# Patient Record
Sex: FEMALE | Race: WHITE | NOT HISPANIC OR LATINO | Employment: PART TIME | URBAN - METROPOLITAN AREA
[De-identification: names, ages, dates, MRNs, and addresses within clinical notes are randomized per-mention and may not be internally consistent; named-entity substitution may affect disease eponyms.]

---

## 2017-10-03 ENCOUNTER — HOSPITAL ENCOUNTER (EMERGENCY)
Facility: CLINIC | Age: 27
Discharge: HOME | End: 2017-10-04
Attending: PSYCHIATRY & NEUROLOGY | Admitting: PSYCHIATRY & NEUROLOGY
Payer: COMMERCIAL

## 2017-10-03 DIAGNOSIS — F41.9 ANXIETY: ICD-10-CM

## 2017-10-03 PROCEDURE — 25000132 ZZH RX MED GY IP 250 OP 250 PS 637: Performed by: PSYCHIATRY & NEUROLOGY

## 2017-10-03 PROCEDURE — 90791 PSYCH DIAGNOSTIC EVALUATION: CPT

## 2017-10-03 PROCEDURE — 99285 EMERGENCY DEPT VISIT HI MDM: CPT | Mod: 25 | Performed by: PSYCHIATRY & NEUROLOGY

## 2017-10-03 PROCEDURE — 99284 EMERGENCY DEPT VISIT MOD MDM: CPT | Mod: Z6 | Performed by: PSYCHIATRY & NEUROLOGY

## 2017-10-03 RX ORDER — ACETAMINOPHEN 325 MG/1
650 TABLET ORAL EVERY 4 HOURS PRN
Status: DISCONTINUED | OUTPATIENT
Start: 2017-10-03 | End: 2017-10-04 | Stop reason: HOSPADM

## 2017-10-03 RX ORDER — HYDROXYZINE HYDROCHLORIDE 25 MG/1
25-50 TABLET, FILM COATED ORAL EVERY 6 HOURS PRN
Qty: 60 TABLET | Refills: 1 | Status: SHIPPED | OUTPATIENT
Start: 2017-10-03 | End: 2019-06-05

## 2017-10-03 RX ORDER — HYDROXYZINE HYDROCHLORIDE 25 MG/1
50 TABLET, FILM COATED ORAL ONCE
Status: COMPLETED | OUTPATIENT
Start: 2017-10-03 | End: 2017-10-03

## 2017-10-03 RX ADMIN — ACETAMINOPHEN 650 MG: 325 TABLET, FILM COATED ORAL at 23:26

## 2017-10-03 RX ADMIN — HYDROXYZINE HYDROCHLORIDE 50 MG: 25 TABLET ORAL at 23:15

## 2017-10-03 ASSESSMENT — ENCOUNTER SYMPTOMS
SHORTNESS OF BREATH: 0
NERVOUS/ANXIOUS: 1
CHEST TIGHTNESS: 0
DYSPHORIC MOOD: 1
HALLUCINATIONS: 0
ABDOMINAL PAIN: 0
BACK PAIN: 0
DIZZINESS: 0
FEVER: 0

## 2017-10-03 NOTE — ED AVS SNAPSHOT
[facility], Emergency Department    [address]    [place] [address]    Phone:  [phone]    Fax:  [phone]                                       Glenys Vuong   MRN: [id number]    Department:  [facility], Emergency Department   Date of Visit:  10/3/2017           After Visit Summary Signature Page     I have received my discharge instructions, and my questions have been answered. I have discussed any challenges I see with this plan with the nurse or doctor.    ..........................................................................................................................................  Patient/Patient Representative Signature      ..........................................................................................................................................  Patient Representative Print Name and Relationship to Patient    ..................................................               ................................................  Date                                            Time    ..........................................................................................................................................  Reviewed by Signature/Title    ...................................................              ..............................................  Date                                                            Time

## 2017-10-03 NOTE — ED AVS SNAPSHOT
[facility], Emergency Department    [address]    [place] [address]    Phone:  [phone]    Fax:  [phone]                                       Glenys Vuong   MRN: [id number]    Department:  [facility], Emergency Department   Date of Visit:  10/3/2017           Patient Information     Date Of Birth          1990        Your diagnoses for this visit were:     Anxiety        You were seen by Oh Smith MD.        Discharge Instructions       Start vistaril (hydroxyzine) 25-50 mg every 6 hours as needed for anxiety/sleep    Go to therapy on Friday 10/6/17 at noon    24 Hour Appointment Hotline       To make an appointment at any [facility] clinic, call [phone] ([phone]). If you don't have a family doctor or clinic, we will help you find one. [facility] clinics are conveniently located to serve the needs of you and your family.             Review of your medicines      START taking        Dose / Directions Last dose taken    hydrOXYzine 25 MG tablet   Commonly known as:  ATARAX   Dose:  25-50 mg   Quantity:  60 tablet        Take 1-2 tablets (25-50 mg) by mouth every 6 hours as needed for itching or anxiety   Refills:  1                Prescriptions were sent or printed at these locations (1 Prescription)                   Other Prescriptions                Printed at Department/Unit printer (1 of 1)         hydrOXYzine (ATARAX) 25 MG tablet                Orders Needing Specimen Collection     Ordered          10/03/17 1718  Drug abuse screen 6 urine (tox) - STAT, [facility]: STAT, Needs to be Collected     Scheduled Task Status   10/03/17 1718 Collect Drug abuse screen 6 urine (tox) Open   Order Class:  PCU Collect                10/03/17 1718  HCG qualitative urine - STAT, [facility]: STAT, Needs to be Collected     Scheduled Task Status   10/03/17 1718 Collect HCG qualitative urine Open   Order Class:  PCU Collect                  Pending Results     No orders found from  "10/1/2017 to 10/4/2017.            Pending Culture Results     No orders found from 10/1/2017 to 10/4/2017.            Pending Results Instructions     If you had any lab results that were not finalized at the time of your Discharge, you can call the ED Lab Result RN at [phone]. You will be contacted by this team for any positive Lab results or changes in treatment. The nurses are available 7 days a week from 10A to 6:30P.  You can leave a message 24 hours per day and they will return your call.        Thank you for choosing [facility]       Thank you for choosing [facility] for your care. Our goal is always to provide you with excellent care. Hearing back from our patients is one way we can continue to improve our services. Please take a few minutes to complete the written survey that you may receive in the mail after you visit with us. Thank you!        [facility] [employer] Information     [employer] lets you send messages to your doctor, view your test results, renew your prescriptions, schedule appointments and more. To sign up, go to [email or web address] . Click on \"Log in\" on the left side of the screen, which will take you to the Welcome page. Then click on \"Sign up Now\" on the right side of the page.     You will be asked to enter the access code listed below, as well as some personal information. Please follow the directions to create your username and password.     Your access code is: [email or web address]  Expires: 2018  9:56 PM     Your access code will  in 90 days. If you need help or a new code, please call your [facility] clinic or [phone].        Care EveryWhere ID     This is your Care EveryWhere ID. This could be used by other organizations to access your [facility] medical records  [id number]        Equal Access to Services     LEXX MELGAR : Shai Jorgensen, mirta cooper, evy silver. So [facility] [phone].    ATENCIÓN: Si habla " español, tiene a hastings disposición servicios gratuitos de asistencia lingüística. Eri al [phone].    We comply with applicable federal civil rights laws and [place] laws. We do not discriminate on the basis of race, color, national origin, age, disability, sex, sexual orientation, or gender identity.            After Visit Summary       This is your record. Keep this with you and show to your community pharmacist(s) and doctor(s) at your next visit.

## 2017-10-04 VITALS
BODY MASS INDEX: 32.21 KG/M2 | HEIGHT: 63 IN | TEMPERATURE: 98.2 F | DIASTOLIC BLOOD PRESSURE: 76 MMHG | SYSTOLIC BLOOD PRESSURE: 123 MMHG | OXYGEN SATURATION: 100 % | RESPIRATION RATE: 16 BRPM | WEIGHT: 181.8 LBS | HEART RATE: 72 BPM

## 2017-10-04 NOTE — DISCHARGE INSTRUCTIONS
Start vistaril (hydroxyzine) 25-50 mg every 6 hours as needed for anxiety/sleep    Go to therapy on Friday 10/6/17 at noon

## 2017-10-04 NOTE — ED PROVIDER NOTES
"  History     Chief Complaint   Patient presents with     Suicidal     has increasing thoughts of harming self but denies plans     The history is provided by medical records and the patient.     Glenys Vuong is a 26 year old female who comes in due to her talking to a crisis line and them telling her she had to come into the ED for an evaluation or they would send the police to her house.  She lives with her gf.  She has had increased anxiety and depression the last 3-4 weeks.  This is due to some stressors including getting fired for no cause at her job when she was just starting to feel comfortable and good at it.  She got a new job but the training is stressful. She has been making it to work but has become tearful, panicky and breaking out into hives before going.  She feels hopeless and struggles with her mood.  She has a poor self esteem and \"I do not fit in.\"  She has chronic passive suicidal thoughts but no plan.  She states she would not do that to her family or gf.  She worries about her siblings and mom due to their own mental illness issues.  She is leery of medications and has tried them in the past.  She also has gone to a lot of therapy but feels it does not work for her.  She is tired of talking about her problems that they cannot fix.      Please see the 's assessment in [employer] from today for further details.    I have reviewed the Medications, Allergies, Past Medical and Surgical History, and Social History in the Epic system.    Review of Systems   Constitutional: Negative for fever.   Eyes: Negative for visual disturbance.   Respiratory: Negative for chest tightness and shortness of breath.    Cardiovascular: Negative for chest pain.   Gastrointestinal: Negative for abdominal pain.   Musculoskeletal: Negative for back pain.   Neurological: Negative for dizziness.   Psychiatric/Behavioral: Positive for dysphoric mood and suicidal ideas (passive, chronic). Negative for hallucinations and " "self-injury. The patient is nervous/anxious.    All other systems reviewed and are negative.      Physical Exam   BP: 136/66  Heart Rate: 75  Temp: 97.6  F (36.4  C)  Resp: 16  Height: 160 cm (5' 3\")  Weight: 82.5 kg (181 lb 12.8 oz)  SpO2: 97 %  Physical Exam   Constitutional: She is oriented to person, place, and time. She appears well-developed and well-nourished.   HENT:   Head: Normocephalic and atraumatic.   Mouth/Throat: Oropharynx is clear and moist.   Eyes: Pupils are equal, round, and reactive to light.   Neck: Normal range of motion. Neck supple.   Cardiovascular: Normal rate, regular rhythm and normal heart sounds.    Pulmonary/Chest: Effort normal and breath sounds normal.   Abdominal: Soft. Bowel sounds are normal.   Musculoskeletal: Normal range of motion.   Neurological: She is alert and oriented to person, place, and time.   Skin: Skin is warm and dry.   Psychiatric: Her speech is normal and behavior is normal. Judgment normal. Her mood appears anxious. She is not actively hallucinating. Thought content is not paranoid and not delusional. Cognition and memory are normal. She exhibits a depressed mood. She expresses suicidal (passive, chronic) ideation. She expresses no homicidal ideation. She expresses no suicidal plans and no homicidal plans.   Glenys is a 27 y/o female who looks her age.  She is well groomed with good eye contact.   Nursing note and vitals reviewed.      ED Course     ED Course     Procedures               Labs Ordered and Resulted from Time of ED Arrival Up to the Time of Departure from the ED - No data to display         Assessments & Plan (with Medical Decision Making)   Glenys will be discharged home.  She is not an imminent risk to herself or others.  She will be started on vistaril 25-50 mg q6 hours prn for anxiety/sleep.  She will be set up for therapy on 10/6/17.  She has chronic suicidal thoughts and is at a higher baseline risk secondary to her history and family " history.      I have reviewed the nursing notes.    I have reviewed the findings, diagnosis, plan and need for follow up with the patient.    New Prescriptions    HYDROXYZINE (ATARAX) 25 MG TABLET    Take 1-2 tablets (25-50 mg) by mouth every 6 hours as needed for itching or anxiety       Final diagnoses:   Anxiety       10/3/2017   [facility], [place], EMERGENCY DEPARTMENT     Oh Smith MD  10/03/17 5228

## 2017-10-04 NOTE — ED NOTES
Pt arrives to [facility]. Psych Associate explains process to pt; they will meet with a doctor and a mental health accessor and a plan will be determined from there. Pt then given a urine cup and asked to leave a urine sample.  Pt offered nutrition, fluids and comfort measures and told it may be a 2-5 hour time frame for complete assessment.

## 2017-10-09 ENCOUNTER — HOSPITAL ENCOUNTER (EMERGENCY)
Facility: CLINIC | Age: 27
Discharge: HOME | End: 2017-10-09
Attending: FAMILY MEDICINE | Admitting: FAMILY MEDICINE
Payer: COMMERCIAL

## 2017-10-09 VITALS
TEMPERATURE: 97.9 F | BODY MASS INDEX: 31.96 KG/M2 | DIASTOLIC BLOOD PRESSURE: 65 MMHG | WEIGHT: 180.4 LBS | OXYGEN SATURATION: 98 % | HEART RATE: 65 BPM | SYSTOLIC BLOOD PRESSURE: 107 MMHG | RESPIRATION RATE: 16 BRPM

## 2017-10-09 DIAGNOSIS — F41.9 ANXIETY: ICD-10-CM

## 2017-10-09 PROCEDURE — 99284 EMERGENCY DEPT VISIT MOD MDM: CPT | Mod: Z6 | Performed by: FAMILY MEDICINE

## 2017-10-09 PROCEDURE — 99285 EMERGENCY DEPT VISIT HI MDM: CPT | Mod: 25 | Performed by: FAMILY MEDICINE

## 2017-10-09 PROCEDURE — 90791 PSYCH DIAGNOSTIC EVALUATION: CPT

## 2017-10-09 RX ORDER — ESCITALOPRAM OXALATE 10 MG/1
10 TABLET ORAL DAILY
Qty: 30 TABLET | Refills: 0 | Status: SHIPPED | OUTPATIENT
Start: 2017-10-09 | End: 2018-07-20

## 2017-10-09 ASSESSMENT — ENCOUNTER SYMPTOMS
DYSURIA: 0
ACTIVITY CHANGE: 0
DIARRHEA: 0
FLANK PAIN: 0
NAUSEA: 1
WEAKNESS: 0
EYE REDNESS: 0
DIFFICULTY URINATING: 0
APPETITE CHANGE: 1
CHEST TIGHTNESS: 0
COLOR CHANGE: 0
SEIZURES: 0
VOMITING: 0
DECREASED CONCENTRATION: 1
DYSPHORIC MOOD: 1
CONFUSION: 0
SHORTNESS OF BREATH: 0
ABDOMINAL PAIN: 1
HEADACHES: 0
TROUBLE SWALLOWING: 0
NECK STIFFNESS: 0
FATIGUE: 1
NERVOUS/ANXIOUS: 1
ARTHRALGIAS: 0
FEVER: 0

## 2017-10-09 NOTE — ED AVS SNAPSHOT
[facility], Emergency Department    [address]    [place] [address]    Phone:  [phone]    Fax:  [phone]                                       Glenys Vuong   MRN: [id number]    Department:  [facility], Emergency Department   Date of Visit:  10/9/2017           Patient Information     Date Of Birth          1990        Your diagnoses for this visit were:     Anxiety        You were seen by Murali Dominguez MD.      Follow-up Information     Follow up with No Ref-Primary, Physician.    Contact information:    NO REF-PRIMARY PHYSICIAN          Discharge Instructions       Home.    To go to appointment this Wednesday as scheduled at 10am [facility] 10/11/2017   Ph # [phone]    Also follow up at [facility] as you are doing with Octavio Cheng Ph # [phone]    Make sure to take the hydroxyzine as directed.    Return if any concerns at all.          24 Hour Appointment Hotline       To make an appointment at any [facility] clinic, call [phone] ([phone]). If you don't have a family doctor or clinic, we will help you find one. [facility] clinics are conveniently located to serve the needs of you and your family.             Review of your medicines      Our records show that you are taking the medicines listed below. If these are incorrect, please call your family doctor or clinic.        Dose / Directions Last dose taken    hydrOXYzine 25 MG tablet   Commonly known as:  ATARAX   Dose:  25-50 mg   Quantity:  60 tablet        Take 1-2 tablets (25-50 mg) by mouth every 6 hours as needed for itching or anxiety   Refills:  1                Orders Needing Specimen Collection     None      Pending Results     No orders found from 10/7/2017 to 10/10/2017.            Pending Culture Results     No orders found from 10/7/2017 to 10/10/2017.            Pending Results Instructions     If you had any lab results that were not finalized at the time of your  "Discharge, you can call the ED Lab Result RN at [phone]. You will be contacted by this team for any positive Lab results or changes in treatment. The nurses are available 7 days a week from 10A to 6:30P.  You can leave a message 24 hours per day and they will return your call.        Thank you for choosing [facility]       Thank you for choosing [facility] for your care. Our goal is always to provide you with excellent care. Hearing back from our patients is one way we can continue to improve our services. Please take a few minutes to complete the written survey that you may receive in the mail after you visit with us. Thank you!        [employer] Information     [employer] lets you send messages to your doctor, view your test results, renew your prescriptions, schedule appointments and more. To sign up, go to [email or web address] . Click on \"Log in\" on the left side of the screen, which will take you to the Welcome page. Then click on \"Sign up Now\" on the right side of the page.     You will be asked to enter the access code listed below, as well as some personal information. Please follow the directions to create your username and password.     Your access code is: [email or web address]  Expires: 2018  9:56 PM     Your access code will  in 90 days. If you need help or a new code, please call your [facility] clinic or [phone].        Care EveryWhere ID     This is your Care EveryWhere ID. This could be used by other organizations to access your [facility] medical records  [id number]        Equal Access to Services     [facility] AH: Hadii juan alberto rojo hadasho Soeileenali, waaxda luqadaha, qaybta kaalmada adeegyada, evy martin . So [facility] [phone].    ATENCIÓN: Si habla español, tiene a hastings disposición servicios gratuitos de asistencia lingüística. Llame al [phone].    We comply with applicable federal civil rights laws and [place] laws. We do not discriminate on the basis of race, " color, national origin, age, disability, sex, sexual orientation, or gender identity.            After Visit Summary       This is your record. Keep this with you and show to your community pharmacist(s) and doctor(s) at your next visit.

## 2017-10-09 NOTE — ED AVS SNAPSHOT
[facility], Emergency Department    [address]    [facility] [address]    Phone:  [phone]    Fax:  [phone]                                       Glenys Vuong   MRN: [id number]    Department:  [facility], Emergency Department   Date of Visit:  10/9/2017           After Visit Summary Signature Page     I have received my discharge instructions, and my questions have been answered. I have discussed any challenges I see with this plan with the nurse or doctor.    ..........................................................................................................................................  Patient/Patient Representative Signature      ..........................................................................................................................................  Patient Representative Print Name and Relationship to Patient    ..................................................               ................................................  Date                                            Time    ..........................................................................................................................................  Reviewed by Signature/Title    ...................................................              ..............................................  Date                                                            Time

## 2017-10-09 NOTE — ED PROVIDER NOTES
History     Chief Complaint   Patient presents with     Anxiety     carrying, insomnia, abdominal discomfort, started week ago,      HPI  Glenys Vuong is a 26 year old female who presents emergency room with increasing anxiety.  Patient has ongoing anxiety issues for several years.  Patient states she did try to defer medications without results.  Patient had been on Ambien and Zoloft and Wellbutrin in the past and they continue to elevate dosing which only causes were side effects she discontinue that.  In the past she's been tried on Prozac for a month but had nausea therefore discontinued that.  Patient noted last week increasing anxiety was seen here in the ER prescribe hydroxyzine.  Patient took 2 hydroxyzine which made her sleep 14 hours a day this weekend patient awoke this morning still with nausea anxiety unable to go to work now presents her for evaluation.  Patient has some chronic suicidal ideations but no active plan.  Denies hallucinations no drug use currently.  Patient here with friend.  Has other symptoms of nausea and insomnia times crying some abdominal discomfort etc.    I have reviewed the Medications, Allergies, Past Medical and Surgical History, and Social History in the Epic system.    Review of Systems   Constitutional: Positive for appetite change and fatigue. Negative for activity change and fever.   HENT: Negative for congestion and trouble swallowing.    Eyes: Negative for redness and visual disturbance.   Respiratory: Negative for chest tightness and shortness of breath.    Cardiovascular: Negative for chest pain.   Gastrointestinal: Positive for abdominal pain (Vague nonspecific.current) and nausea. Negative for diarrhea and vomiting.   Genitourinary: Negative for difficulty urinating, dysuria and flank pain.   Musculoskeletal: Negative for arthralgias, gait problem and neck stiffness.   Skin: Negative for color change and rash.   Allergic/Immunologic: Negative for immunocompromised  state.   Neurological: Negative for seizures, syncope, weakness and headaches.   Psychiatric/Behavioral: Positive for decreased concentration and dysphoric mood. Negative for confusion, self-injury and suicidal ideas (no plan). The patient is nervous/anxious.    All other systems reviewed and are negative.      Physical Exam   BP: 111/64  Pulse: 61  Temp: 97.9  F (36.6  C)  Resp: 16  Weight: 81.8 kg (180 lb 6.4 oz)  SpO2: 99 %       Physical Exam   Constitutional: She is oriented to person, place, and time. She appears well-developed and well-nourished. She appears distressed.   Patient anxious here   HENT:   Head: Normocephalic and atraumatic.   Eyes: No scleral icterus.   Neck: Normal range of motion. Neck supple.   Cardiovascular: Regular rhythm.    Pulmonary/Chest: Breath sounds normal. No stridor. No respiratory distress.   Abdominal: She exhibits no distension. There is no tenderness.   Musculoskeletal: She exhibits no edema, tenderness or deformity.   Neurological: She is alert and oriented to person, place, and time. She has normal reflexes. No cranial nerve deficit. Coordination normal.   Skin: Skin is warm and dry. No rash noted. She is not diaphoretic. No erythema. No pallor.   Psychiatric:   Patient mildly flat affect.  Patient though is not delusional is not psychotic.  No hyperventilation.  Describe some anxiety.  Is not tearful at this point patient has no active suicidal plan   Nursing note and vitals reviewed.      ED Course     ED Course     In ER patient was evaluated.  Patient seen by [facility]  also.    Patient somewhat difficulty in getting appointments but we were able to arrange appointment that would be appropriate for her in the next couple days.  Elected to start her on some Lexapro 10 mg daily to see if this helps the anxiety over the next few months.  Patient is also set up with a medical psychiatric evaluation on Wednesday in the afternoon also.  Patient presents plan and comfortably  discharge will return if any concerns      Procedures             Critical Care time:  none             Labs Ordered and Resulted from Time of ED Arrival Up to the Time of Departure from the ED - No data to display         Assessments & Plan (with Medical Decision Making)  26-year-old female chronic ongoing anxiety having increasing anxiety episodes.  Patient tried on multiple medications different therapy etc. very difficult at this point to find anything that will be helpful.  The patient here has chronic suicidal ideations but no active plan and has no intent and feels comfortable.  Patient with significant other.  Patient seen by [facility]  at this point we arranged an outpatient evaluation in 2 days for both her mental health issues along with a medical mental health eval.  Patient started on Lexapro 10 mg daily.  Can use the hydroxyzine if needed return if any safety issues or concerns patient agrees with this plan as company discharged in note for work also given today.           I have reviewed the nursing notes.    I have reviewed the findings, diagnosis, plan and need for follow up with the patient.    Discharge Medication List as of 10/9/2017  8:44 AM          Final diagnoses:   Anxiety       10/9/2017   [facility], [place], EMERGENCY DEPARTMENT    This note was created at least in part by the use of dragon voice dictation system. Inadvertent typographical errors may still exist.  Murali Dominguez MD.         Murali Dominguez MD  10/09/17 3516

## 2017-10-09 NOTE — LETTER
To Whom it may concern:      Glenys Vuong was seen in our Emergency Department today, 10/09/17.  I expect her condition to improve over the next several days.  She may return to work when improved.  Please excuse from work today.    Sincerely,        Murali Dominguez MD

## 2017-10-09 NOTE — DISCHARGE INSTRUCTIONS
Home.    To go to appointment this Wednesday as scheduled at 10am [facility] 10/11/2017   Ph # [phone]    Also follow up at [facility] as you are doing with Octavio Cheng Ph # [phone]    Make sure to take the hydroxyzine as directed.    Begin the Lexapro 10mg daily to see if gradual help with anxiety.    See Oumou Atkinson Wednesday 10/11/2017 at 2pm at [facility] for medication follow up.    Return if any concerns at all.

## 2017-12-31 ENCOUNTER — HEALTH MAINTENANCE LETTER (OUTPATIENT)
Age: 27
End: 2017-12-31

## 2018-03-13 ENCOUNTER — TRANSFERRED RECORDS (OUTPATIENT)
Dept: HEALTH INFORMATION MANAGEMENT | Facility: CLINIC | Age: 28
End: 2018-03-13

## 2018-05-11 ENCOUNTER — HOSPITAL ENCOUNTER (EMERGENCY)
Facility: CLINIC | Age: 28
Discharge: HOME | End: 2018-05-11
Attending: EMERGENCY MEDICINE | Admitting: EMERGENCY MEDICINE
Payer: OTHER MISCELLANEOUS

## 2018-05-11 VITALS
HEIGHT: 64 IN | SYSTOLIC BLOOD PRESSURE: 99 MMHG | TEMPERATURE: 97 F | DIASTOLIC BLOOD PRESSURE: 32 MMHG | HEART RATE: 53 BPM | WEIGHT: 190 LBS | BODY MASS INDEX: 32.44 KG/M2 | OXYGEN SATURATION: 99 % | RESPIRATION RATE: 14 BRPM
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
## 2018-05-11 LAB
ALBUMIN SERPL-MCNC: 3.5 G/DL (ref 3.4–5)
ALP SERPL-CCNC: 48 U/L (ref 40–150)
ALT SERPL W P-5'-P-CCNC: 14 U/L (ref 0–50)
ANION GAP SERPL CALCULATED.3IONS-SCNC: 6 MMOL/L (ref 3–14)
AST SERPL W P-5'-P-CCNC: 14 U/L (ref 0–45)
BASOPHILS # BLD AUTO: 0 10E9/L (ref 0–0.2)
BASOPHILS NFR BLD AUTO: 0.3 %
BILIRUB SERPL-MCNC: 0.3 MG/DL (ref 0.2–1.3)
BUN SERPL-MCNC: 13 MG/DL (ref 7–30)
CALCIUM SERPL-MCNC: 8 MG/DL (ref 8.5–10.1)
CHLORIDE SERPL-SCNC: 108 MMOL/L (ref 94–109)
CK SERPL-CCNC: 106 U/L (ref 30–225)
CO2 SERPL-SCNC: 24 MMOL/L (ref 20–32)
CREAT SERPL-MCNC: 0.94 MG/DL (ref 0.52–1.04)
DIFFERENTIAL METHOD BLD: NORMAL
EOSINOPHIL # BLD AUTO: 0.2 10E9/L (ref 0–0.7)
EOSINOPHIL NFR BLD AUTO: 2.6 %
ERYTHROCYTE [DISTWIDTH] IN BLOOD BY AUTOMATED COUNT: 14.2 % (ref 10–15)
GFR SERPL CREATININE-BSD FRML MDRD: 71 ML/MIN/1.7M2
GLUCOSE SERPL-MCNC: 104 MG/DL (ref 70–99)
HCT VFR BLD AUTO: 35.8 % (ref 35–47)
HGB BLD-MCNC: 11.7 G/DL (ref 11.7–15.7)
IMM GRANULOCYTES # BLD: 0 10E9/L (ref 0–0.4)
IMM GRANULOCYTES NFR BLD: 0.2 %
LYMPHOCYTES # BLD AUTO: 2.2 10E9/L (ref 0.8–5.3)
LYMPHOCYTES NFR BLD AUTO: 32.4 %
MCH RBC QN AUTO: 27.7 PG (ref 26.5–33)
MCHC RBC AUTO-ENTMCNC: 32.7 G/DL (ref 31.5–36.5)
MCV RBC AUTO: 85 FL (ref 78–100)
MONOCYTES # BLD AUTO: 0.5 10E9/L (ref 0–1.3)
MONOCYTES NFR BLD AUTO: 8.1 %
NEUTROPHILS # BLD AUTO: 3.8 10E9/L (ref 1.6–8.3)
NEUTROPHILS NFR BLD AUTO: 56.4 %
NRBC # BLD AUTO: 0 10*3/UL
NRBC BLD AUTO-RTO: 0 /100
PLATELET # BLD AUTO: 162 10E9/L (ref 150–450)
POTASSIUM SERPL-SCNC: 3.7 MMOL/L (ref 3.4–5.3)
PROT SERPL-MCNC: 6.6 G/DL (ref 6.8–8.8)
RBC # BLD AUTO: 4.23 10E12/L (ref 3.8–5.2)
SODIUM SERPL-SCNC: 138 MMOL/L (ref 133–144)
TROPONIN I SERPL-MCNC: <0.015 UG/L (ref 0–0.04)
WBC # BLD AUTO: 6.6 10E9/L (ref 4–11)

## 2018-05-11 PROCEDURE — 82550 ASSAY OF CK (CPK): CPT | Performed by: EMERGENCY MEDICINE

## 2018-05-11 PROCEDURE — 36415 COLL VENOUS BLD VENIPUNCTURE: CPT | Performed by: EMERGENCY MEDICINE

## 2018-05-11 PROCEDURE — 80053 COMPREHEN METABOLIC PANEL: CPT | Performed by: EMERGENCY MEDICINE

## 2018-05-11 PROCEDURE — 84484 ASSAY OF TROPONIN QUANT: CPT | Performed by: EMERGENCY MEDICINE

## 2018-05-11 PROCEDURE — 99284 EMERGENCY DEPT VISIT MOD MDM: CPT

## 2018-05-11 PROCEDURE — 85025 COMPLETE CBC W/AUTO DIFF WBC: CPT | Performed by: EMERGENCY MEDICINE

## 2018-05-11 PROCEDURE — 93005 ELECTROCARDIOGRAM TRACING: CPT
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
## 2018-05-11 NOTE — ED AVS SNAPSHOT
Emergency Department    [address] [place] [address]    Phone:  [phone]    Fax:  [phone]                                       Glenys Vuong   MRN: [id number]    Department:   Emergency Department   Date of Visit:  5/11/2018           After Visit Summary Signature Page     I have received my discharge instructions, and my questions have been answered. I have discussed any challenges I see with this plan with the nurse or doctor.    ..........................................................................................................................................  Patient/Patient Representative Signature      ..........................................................................................................................................  Patient Representative Print Name and Relationship to Patient    ..................................................               ................................................  Date                                            Time    ..........................................................................................................................................  Reviewed by Signature/Title    ...................................................              ..............................................  Date                                                            Time

## 2018-05-11 NOTE — LETTER
May 11, 2018      To Whom It May Concern:      Glenys Vuong was seen in our Emergency Department today, 05/11/18.  I expect her condition to improve over the next day.  She may return to work/school when improved.    Sincerely,        Linda Salazar RN

## 2018-05-14 LAB — INTERPRETATION ECG - MUSE: NORMAL

## 2018-07-20 ENCOUNTER — APPOINTMENT (OUTPATIENT)
Dept: GENERAL RADIOLOGY | Facility: CLINIC | Age: 28
DRG: 552 | End: 2018-07-20
Attending: EMERGENCY MEDICINE
Payer: OTHER MISCELLANEOUS

## 2018-07-20 ENCOUNTER — APPOINTMENT (OUTPATIENT)
Dept: CT IMAGING | Facility: CLINIC | Age: 28
DRG: 552 | End: 2018-07-20
Attending: EMERGENCY MEDICINE
Payer: OTHER MISCELLANEOUS

## 2018-07-20 ENCOUNTER — HOSPITAL ENCOUNTER (INPATIENT)
Facility: CLINIC | Age: 28
LOS: 1 days | Discharge: HOME | DRG: 552 | End: 2018-07-21
Attending: EMERGENCY MEDICINE | Admitting: INTERNAL MEDICINE
Payer: OTHER MISCELLANEOUS

## 2018-07-20 DIAGNOSIS — S16.1XXA STRAIN OF NECK MUSCLE, INITIAL ENCOUNTER: ICD-10-CM

## 2018-07-20 DIAGNOSIS — S90.31XA CONTUSION OF RIGHT HEEL, INITIAL ENCOUNTER: ICD-10-CM

## 2018-07-20 DIAGNOSIS — S50.311A ABRASION OF RIGHT ELBOW, INITIAL ENCOUNTER: ICD-10-CM
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
## 2018-07-20 DIAGNOSIS — S50.01XA CONTUSION OF RIGHT ELBOW, INITIAL ENCOUNTER: ICD-10-CM

## 2018-07-20 DIAGNOSIS — M54.50 ACUTE LEFT-SIDED LOW BACK PAIN WITHOUT SCIATICA: ICD-10-CM

## 2018-07-20 LAB
ABO + RH BLD: NORMAL
ABO + RH BLD: NORMAL
AMPHETAMINES UR QL SCN: NEGATIVE
ANION GAP SERPL CALCULATED.3IONS-SCNC: 7 MMOL/L (ref 3–14)
BARBITURATES UR QL: NEGATIVE
BASOPHILS # BLD AUTO: 0 10E9/L (ref 0–0.2)
BASOPHILS NFR BLD AUTO: 0.4 %
BENZODIAZ UR QL: POSITIVE
BLD GP AB SCN SERPL QL: NORMAL
BLOOD BANK CMNT PATIENT-IMP: NORMAL
BUN SERPL-MCNC: 12 MG/DL (ref 7–30)
CALCIUM SERPL-MCNC: 8.2 MG/DL (ref 8.5–10.1)
CANNABINOIDS UR QL SCN: POSITIVE
CHLORIDE SERPL-SCNC: 106 MMOL/L (ref 94–109)
CO2 SERPL-SCNC: 27 MMOL/L (ref 20–32)
COCAINE UR QL: NEGATIVE
CREAT SERPL-MCNC: 0.97 MG/DL (ref 0.52–1.04)
DIFFERENTIAL METHOD BLD: ABNORMAL
EOSINOPHIL # BLD AUTO: 0.2 10E9/L (ref 0–0.7)
EOSINOPHIL NFR BLD AUTO: 1.9 %
ERYTHROCYTE [DISTWIDTH] IN BLOOD BY AUTOMATED COUNT: 13.4 % (ref 10–15)
GFR SERPL CREATININE-BSD FRML MDRD: 69 ML/MIN/1.7M2
GLUCOSE SERPL-MCNC: 89 MG/DL (ref 70–99)
HCT VFR BLD AUTO: 35.4 % (ref 35–47)
HGB BLD-MCNC: 11.6 G/DL (ref 11.7–15.7)
IMM GRANULOCYTES # BLD: 0 10E9/L (ref 0–0.4)
IMM GRANULOCYTES NFR BLD: 0.1 %
LYMPHOCYTES # BLD AUTO: 1.9 10E9/L (ref 0.8–5.3)
LYMPHOCYTES NFR BLD AUTO: 24 %
MCH RBC QN AUTO: 27.8 PG (ref 26.5–33)
MCHC RBC AUTO-ENTMCNC: 32.8 G/DL (ref 31.5–36.5)
MCV RBC AUTO: 85 FL (ref 78–100)
MONOCYTES # BLD AUTO: 0.6 10E9/L (ref 0–1.3)
MONOCYTES NFR BLD AUTO: 7.7 %
NEUTROPHILS # BLD AUTO: 5.1 10E9/L (ref 1.6–8.3)
NEUTROPHILS NFR BLD AUTO: 65.9 %
NRBC # BLD AUTO: 0 10*3/UL
NRBC BLD AUTO-RTO: 0 /100
OPIATES UR QL SCN: POSITIVE
PCP UR QL SCN: NEGATIVE
PLATELET # BLD AUTO: 188 10E9/L (ref 150–450)
POTASSIUM SERPL-SCNC: 3.9 MMOL/L (ref 3.4–5.3)
RBC # BLD AUTO: 4.17 10E12/L (ref 3.8–5.2)
SODIUM SERPL-SCNC: 140 MMOL/L (ref 133–144)
SPECIMEN EXP DATE BLD: NORMAL
WBC # BLD AUTO: 7.8 10E9/L (ref 4–11)

## 2018-07-20 PROCEDURE — 71046 X-RAY EXAM CHEST 2 VIEWS: CPT

## 2018-07-20 PROCEDURE — 73080 X-RAY EXAM OF ELBOW: CPT | Mod: RT

## 2018-07-20 PROCEDURE — 85025 COMPLETE CBC W/AUTO DIFF WBC: CPT | Performed by: EMERGENCY MEDICINE

## 2018-07-20 PROCEDURE — 25000128 H RX IP 250 OP 636: Performed by: EMERGENCY MEDICINE

## 2018-07-20 PROCEDURE — 25000128 H RX IP 250 OP 636: Performed by: INTERNAL MEDICINE

## 2018-07-20 PROCEDURE — 96376 TX/PRO/DX INJ SAME DRUG ADON: CPT

## 2018-07-20 PROCEDURE — 72131 CT LUMBAR SPINE W/O DYE: CPT

## 2018-07-20 PROCEDURE — 86900 BLOOD TYPING SEROLOGIC ABO: CPT | Performed by: EMERGENCY MEDICINE

## 2018-07-20 PROCEDURE — 86901 BLOOD TYPING SEROLOGIC RH(D): CPT | Performed by: EMERGENCY MEDICINE

## 2018-07-20 PROCEDURE — 96375 TX/PRO/DX INJ NEW DRUG ADDON: CPT

## 2018-07-20 PROCEDURE — 80048 BASIC METABOLIC PNL TOTAL CA: CPT | Performed by: EMERGENCY MEDICINE

## 2018-07-20 PROCEDURE — 72040 X-RAY EXAM NECK SPINE 2-3 VW: CPT

## 2018-07-20 PROCEDURE — 74177 CT ABD & PELVIS W/CONTRAST: CPT

## 2018-07-20 PROCEDURE — 86850 RBC ANTIBODY SCREEN: CPT | Performed by: EMERGENCY MEDICINE

## 2018-07-20 PROCEDURE — 80307 DRUG TEST PRSMV CHEM ANLYZR: CPT | Performed by: INTERNAL MEDICINE

## 2018-07-20 PROCEDURE — 73650 X-RAY EXAM OF HEEL: CPT | Mod: RT

## 2018-07-20 PROCEDURE — 12000000 ZZH R&B MED SURG/OB

## 2018-07-20 PROCEDURE — 25000132 ZZH RX MED GY IP 250 OP 250 PS 637: Performed by: INTERNAL MEDICINE

## 2018-07-20 PROCEDURE — 96374 THER/PROPH/DIAG INJ IV PUSH: CPT | Mod: 59

## 2018-07-20 PROCEDURE — 25000125 ZZHC RX 250: Performed by: EMERGENCY MEDICINE

## 2018-07-20 PROCEDURE — 25000128 H RX IP 250 OP 636

## 2018-07-20 PROCEDURE — G0378 HOSPITAL OBSERVATION PER HR: HCPCS

## 2018-07-20 PROCEDURE — 99285 EMERGENCY DEPT VISIT HI MDM: CPT | Mod: 25

## 2018-07-20 PROCEDURE — 99223 1ST HOSP IP/OBS HIGH 75: CPT | Mod: AI | Performed by: INTERNAL MEDICINE

## 2018-07-20 RX ORDER — CLONAZEPAM 0.5 MG/1
1 TABLET ORAL DAILY PRN
Status: DISCONTINUED | OUTPATIENT
Start: 2018-07-20 | End: 2018-07-21 | Stop reason: HOSPADM

## 2018-07-20 RX ORDER — HYDROMORPHONE HYDROCHLORIDE 1 MG/ML
0.5 INJECTION, SOLUTION INTRAMUSCULAR; INTRAVENOUS; SUBCUTANEOUS EVERY 30 MIN PRN
Status: COMPLETED | OUTPATIENT
Start: 2018-07-20 | End: 2018-07-20

## 2018-07-20 RX ORDER — PROPRANOLOL HYDROCHLORIDE 10 MG/1
10 TABLET ORAL 3 TIMES DAILY
COMMUNITY
End: 2024-01-23

## 2018-07-20 RX ORDER — HYDROMORPHONE HYDROCHLORIDE 1 MG/ML
.3-.5 INJECTION, SOLUTION INTRAMUSCULAR; INTRAVENOUS; SUBCUTANEOUS
Status: DISCONTINUED | OUTPATIENT
Start: 2018-07-20 | End: 2018-07-21 | Stop reason: HOSPADM

## 2018-07-20 RX ORDER — ONDANSETRON 2 MG/ML
4 INJECTION INTRAMUSCULAR; INTRAVENOUS ONCE
Status: DISCONTINUED | OUTPATIENT
Start: 2018-07-20 | End: 2018-07-20

## 2018-07-20 RX ORDER — NALOXONE HYDROCHLORIDE 0.4 MG/ML
.1-.4 INJECTION, SOLUTION INTRAMUSCULAR; INTRAVENOUS; SUBCUTANEOUS
Status: DISCONTINUED | OUTPATIENT
Start: 2018-07-20 | End: 2018-07-21 | Stop reason: HOSPADM

## 2018-07-20 RX ORDER — FENTANYL CITRATE 50 UG/ML
100 INJECTION, SOLUTION INTRAMUSCULAR; INTRAVENOUS ONCE
Status: COMPLETED | OUTPATIENT
Start: 2018-07-20 | End: 2018-07-20

## 2018-07-20 RX ORDER — IOPAMIDOL 755 MG/ML
95 INJECTION, SOLUTION INTRAVASCULAR ONCE
Status: COMPLETED | OUTPATIENT
Start: 2018-07-20 | End: 2018-07-20

## 2018-07-20 RX ORDER — HYDROCODONE BITARTRATE AND ACETAMINOPHEN 5; 325 MG/1; MG/1
1-2 TABLET ORAL EVERY 4 HOURS PRN
Status: DISCONTINUED | OUTPATIENT
Start: 2018-07-20 | End: 2018-07-21 | Stop reason: HOSPADM

## 2018-07-20 RX ORDER — KETOROLAC TROMETHAMINE 15 MG/ML
15 INJECTION, SOLUTION INTRAMUSCULAR; INTRAVENOUS ONCE
Status: COMPLETED | OUTPATIENT
Start: 2018-07-20 | End: 2018-07-20

## 2018-07-20 RX ORDER — ACETAMINOPHEN 325 MG/1
650 TABLET ORAL EVERY 4 HOURS PRN
Status: DISCONTINUED | OUTPATIENT
Start: 2018-07-20 | End: 2018-07-21 | Stop reason: HOSPADM

## 2018-07-20 RX ORDER — HYDROMORPHONE HYDROCHLORIDE 1 MG/ML
0.5 INJECTION, SOLUTION INTRAMUSCULAR; INTRAVENOUS; SUBCUTANEOUS EVERY 30 MIN PRN
Status: DISCONTINUED | OUTPATIENT
Start: 2018-07-20 | End: 2018-07-20

## 2018-07-20 RX ORDER — LORAZEPAM 2 MG/ML
0.5 INJECTION INTRAMUSCULAR ONCE
Status: COMPLETED | OUTPATIENT
Start: 2018-07-20 | End: 2018-07-20

## 2018-07-20 RX ORDER — SODIUM CHLORIDE 9 MG/ML
INJECTION, SOLUTION INTRAVENOUS CONTINUOUS
Status: DISCONTINUED | OUTPATIENT
Start: 2018-07-20 | End: 2018-07-21 | Stop reason: HOSPADM

## 2018-07-20 RX ORDER — IBUPROFEN 600 MG/1
600 TABLET, FILM COATED ORAL EVERY 6 HOURS PRN
Status: DISCONTINUED | OUTPATIENT
Start: 2018-07-20 | End: 2018-07-21 | Stop reason: HOSPADM

## 2018-07-20 RX ORDER — HYDROXYZINE HYDROCHLORIDE 25 MG/1
25-50 TABLET, FILM COATED ORAL EVERY 6 HOURS PRN
Status: DISCONTINUED | OUTPATIENT
Start: 2018-07-20 | End: 2018-07-21 | Stop reason: HOSPADM

## 2018-07-20 RX ORDER — AMOXICILLIN 250 MG
1 CAPSULE ORAL 2 TIMES DAILY PRN
Status: DISCONTINUED | OUTPATIENT
Start: 2018-07-20 | End: 2018-07-21 | Stop reason: HOSPADM

## 2018-07-20 RX ORDER — ONDANSETRON 2 MG/ML
4 INJECTION INTRAMUSCULAR; INTRAVENOUS EVERY 6 HOURS PRN
Status: DISCONTINUED | OUTPATIENT
Start: 2018-07-20 | End: 2018-07-21 | Stop reason: HOSPADM

## 2018-07-20 RX ORDER — AMOXICILLIN 250 MG
2 CAPSULE ORAL 2 TIMES DAILY PRN
Status: DISCONTINUED | OUTPATIENT
Start: 2018-07-20 | End: 2018-07-21 | Stop reason: HOSPADM

## 2018-07-20 RX ORDER — FENTANYL CITRATE 50 UG/ML
INJECTION, SOLUTION INTRAMUSCULAR; INTRAVENOUS
Status: COMPLETED
Start: 2018-07-20 | End: 2018-07-20

## 2018-07-20 RX ORDER — LAMOTRIGINE 100 MG/1
200 TABLET ORAL
COMMUNITY
End: 2018-07-20

## 2018-07-20 RX ORDER — ONDANSETRON 4 MG/1
4 TABLET, ORALLY DISINTEGRATING ORAL EVERY 6 HOURS PRN
Status: DISCONTINUED | OUTPATIENT
Start: 2018-07-20 | End: 2018-07-21 | Stop reason: HOSPADM

## 2018-07-20 RX ORDER — PROPRANOLOL HYDROCHLORIDE 10 MG/1
10 TABLET ORAL 3 TIMES DAILY
Status: DISCONTINUED | OUTPATIENT
Start: 2018-07-20 | End: 2018-07-21 | Stop reason: HOSPADM

## 2018-07-20 RX ORDER — LAMOTRIGINE 100 MG/1
200 TABLET ORAL DAILY
Status: DISCONTINUED | OUTPATIENT
Start: 2018-07-21 | End: 2018-07-21 | Stop reason: HOSPADM

## 2018-07-20 RX ADMIN — LORAZEPAM 0.5 MG: 2 INJECTION INTRAMUSCULAR; INTRAVENOUS at 15:27

## 2018-07-20 RX ADMIN — HYDROCODONE BITARTRATE AND ACETAMINOPHEN 1 TABLET: 5; 325 TABLET ORAL at 19:21

## 2018-07-20 RX ADMIN — ONDANSETRON 4 MG: 2 INJECTION INTRAMUSCULAR; INTRAVENOUS at 19:48

## 2018-07-20 RX ADMIN — Medication 0.5 MG: at 16:28

## 2018-07-20 RX ADMIN — SODIUM CHLORIDE, PRESERVATIVE FREE 69 ML: 5 INJECTION INTRAVENOUS at 13:38

## 2018-07-20 RX ADMIN — SODIUM CHLORIDE 1000 ML: 9 INJECTION, SOLUTION INTRAVENOUS at 12:27

## 2018-07-20 RX ADMIN — Medication 0.5 MG: at 15:23

## 2018-07-20 RX ADMIN — Medication 0.5 MG: at 13:42

## 2018-07-20 RX ADMIN — SODIUM CHLORIDE 100 ML/HR: 9 INJECTION, SOLUTION INTRAVENOUS at 18:21

## 2018-07-20 RX ADMIN — Medication 0.5 MG: at 13:04

## 2018-07-20 RX ADMIN — FENTANYL CITRATE 100 MCG: 50 INJECTION INTRAMUSCULAR; INTRAVENOUS at 12:25

## 2018-07-20 RX ADMIN — IOPAMIDOL 95 ML: 755 INJECTION, SOLUTION INTRAVENOUS at 13:37

## 2018-07-20 RX ADMIN — FENTANYL CITRATE 100 MCG: 50 INJECTION, SOLUTION INTRAMUSCULAR; INTRAVENOUS at 12:25

## 2018-07-20 RX ADMIN — PROPRANOLOL HYDROCHLORIDE 10 MG: 10 TABLET ORAL at 21:28

## 2018-07-20 RX ADMIN — KETOROLAC TROMETHAMINE 15 MG: 15 INJECTION, SOLUTION INTRAMUSCULAR; INTRAVENOUS at 15:20

## 2018-07-20 RX ADMIN — ENOXAPARIN SODIUM 40 MG: 40 INJECTION SUBCUTANEOUS at 21:28

## 2018-07-20 ASSESSMENT — ENCOUNTER SYMPTOMS
NECK PAIN: 1
BACK PAIN: 1

## 2018-07-20 NOTE — ED NOTES
[facility]  ED Nurse Handoff Report    ED Chief complaint: Fall (pt fell approx 8', landed on feet, did not hit head, denies neck pain. pt c/o right elbow, foot, back pain)      ED Diagnosis:   Final diagnoses:   None       Code Status: Full Code    Allergies: No Known Allergies    Activity level - Baseline/Home:  Independent    Activity Level - Current:   Unable to Assess, patient reports she is in too much pain to move     Needed?: No    Isolation: No  Infection: Not Applicable  Bariatric?: No    Vital Signs:   Vitals:    07/20/18 1434 07/20/18 1517 07/20/18 1527 07/20/18 1530   BP:  107/64  115/74   Pulse:       Resp:       Temp:       TempSrc:       SpO2: 98%  100%    Weight:           Cardiac Rhythm: ,        Pain level: 0-10 Pain Scale: 7    Is this patient confused?: No alert and oriented x 4  [place] - Suicide Severity Rating Scale Completed?  Yes  If yes, what color did the patient score?  Orange: patient reports she had self harm behaviors in highschool years ago, she sees a physiatrist regularly and feels confident in this. She denies currently being suicidal or current self harm     Patient Report: Initial Complaint: fall  Focused Assessment: pt reports falling an estimated 10ft from the ceiling at her work. She says the ceiling just gave through and she landed on her left elbow, hip and ankle. She arrived to the er via private vehicle with complaints to left elbow, ankle pain, back pain, left sided neck pain. She denies hitting her head or LOC. In er she is alert and oriented x 4. CMS is intact. Pt was initally placed in c collar, but this was removed after x ray and Dr. Palumbo cleared c spine. X-rays of right ankle, elbow, neck and chest negative for fractures. CT abd/pelvis and back negative for fractures. PT states she is unable to ambulate due to pain. In ed she has received 100mcg fentanyl 0.5mg dilaudid x 3, 0.5mg ativan IVP, 15mg tordol. Pain remains 7/10  Tests  Performed: numerous x ray, ct scans. ABO, cbc,bmp  Abnormal Results: see epic  Treatments provided:  In ed she has received 100mcg fentanyl 0.5mg dilaudid x 3, 0.5mg ativan IVP, 15mg tordol. 1L NS Pain remains 7/10    Family Comments: Here with mom and fidoroteo     OBS brochure/video discussed/provided to patient: Yes    ED Medications:   Medications   ondansetron (ZOFRAN) injection 4 mg (not administered)   0.9% sodium chloride BOLUS (not administered)   ondansetron (ZOFRAN) injection 4 mg (not administered)   0.9% sodium chloride BOLUS (not administered)   0.9% sodium chloride BOLUS (1,000 mLs Intravenous New Bag 7/20/18 1227)   fentaNYL (PF) (SUBLIMAZE) injection 100 mcg (100 mcg Intravenous Given 7/20/18 1225)   HYDROmorphone (PF) (DILAUDID) injection 0.5 mg (0.5 mg Intravenous Given 7/20/18 1523)   iopamidol (ISOVUE-370) solution 95 mL (95 mLs Intravenous Given 7/20/18 1337)   Saline flush (69 mLs Intravenous Given 7/20/18 1338)   LORazepam (ATIVAN) injection 0.5 mg (0.5 mg Intravenous Given 7/20/18 1527)   ketorolac (TORADOL) injection 15 mg (15 mg Intravenous Given 7/20/18 1520)       Drips infusing?:  No    For the majority of the shift this patient was Green.   Interventions performed were frequent rounding .    Severe Sepsis OR Septic Shock Diagnosis Present: No      ED NURSE PHONE NUMBER: [id number]

## 2018-07-20 NOTE — ED NOTES
Patient reports in high school she attempted self harm. She states she regularly see's a therapist for this and denies current self harm, or suicidal idelation

## 2018-07-20 NOTE — PROGRESS NOTES
RECEIVING UNIT ED HANDOFF REVIEW    ED Nurse Handoff Report was reviewed by: Paula Levi on July 20, 2018 at 5:19 PM     ]

## 2018-07-20 NOTE — ED PROVIDER NOTES
History     Chief Complaint:  Fall    HPI   Glenys Vuong is a 27 year old female who presents with an injury from a fall.  Patient says that she was at work this morning and fell from a 10 foot high metal drop ceiling.  As result of the fall, the patient says that she has left-sided neck pain, right arm bruising, back pain, and right heel pain.  She denies any loss of consciousness.       Allergies:  No known drug allergies    Medications:    Lexapro  Zoloft   Atarax   Inderal     Past Medical History:    Anxiety   Depression    Past Surgical History:    History reviewed. No pertinent surgical history.    Family History:    History reviewed. No pertinent family history.     Social History:  Smoking Status: Never Smoker  Alcohol Use: Yes  Patient presents with fiance and mother.  Marital Status:   [2]      Review of Systems   Musculoskeletal: Positive for back pain and neck pain.        Foot pain and arm bruising    Neurological: Negative for syncope.   All other systems reviewed and are negative.    Physical Exam     Patient Vitals for the past 24 hrs:   BP Temp Temp src Pulse Heart Rate Resp SpO2 Weight   07/20/18 1530 115/74 - - - - - - -   07/20/18 1527 - - - - - - 100 % -   07/20/18 1517 107/64 - - - - - - -   07/20/18 1434 - - - - - - 98 % -   07/20/18 1400 - - - - - - 100 % -   07/20/18 1146 124/60 98  F (36.7  C) Oral 68 68 16 98 % 86.2 kg (190 lb)       Physical Exam  General: Initially standing in the room, tearful, scared, significant pain.  We were able to get her into a left lateral recumbent position on the gurney.  A C collar was placed.    Head:  The scalp, face, and head appear normal.  NO signs of trauma.    Eyes:  The pupils are equal, round, and reactive to light    There is no nystagmus    Extraocular muscles are intact    Conjunctivae and sclerae are normal  ENT:    The nose is normal    Pinnae are normal    The oropharynx is normal    Uvula is in the midline  Neck:  Normal range of  motion    There is no rigidity noted    The left paracervical muscles are tender to palpation, no bony midline neck tenderness however there is significant distracting pain.      Trachea is in the midline    No mass is detected  CV:  Regular rate and underlying rhythm     Normal S1/S2, no S3/S4    No pathological murmur detected  Resp:  Lungs are clear    There is no tachypnea    Non-labored    No rales    No wheezing   GI:  Abdomen is soft, there is no rigidity    No distension    No tympani    No rebound tenderness     Non-surgical without peritoneal features  MS:     Right arm wrist and hand are normal. 4 cm bruise and abrasion to the proximal ulna of the right arm.  There is diffuse pain in this location.  The Olecranon is non tender.  The humerus is non tender.    Right leg:  Right calcaneus tender to palpation.  Femur, knee, shin, and ankle are without significant tenderness.  Left Arm: no complaints at this time.  No signs of obvious trauma.   Left leg:  No pain at this time.  Back:  There is mild lumbar midline pain.  There is tenderness to the right paraspinal soft tissues.  There is a concern for hematoma/spasm.    Pelvis: there is tenderness involving the post pelvic rim/ilium.  The left post pelvis and back are non tender.    Skin:  No rash.   Neuro: Speech is normal and fluent  Psych:  Awake. Alert.      Normal affect.  Appropriate interactions.  Lymph: No anterior cervical lymphadenopathy noted    Emergency Department Course     Imaging:  Radiographic findings were communicated with the patient who voiced understanding of the findings.  X-ray right elbow, 3 views:  No evidence of fracture.   Result per radiology.     CT Lumbar Spine without contrast:   No evidence of acute lumbar spine injury.    As per radiology.    CT Abdomen/Pelvis with IV contrast:   No acute traumatic abnormality.   As per radiology.    X-ray chest, 2 views:  The cardiac silhouette and pulmonary vasculature are  within normal  limits. No evidence of pneumothorax or pleural effusion.  The lungs are clear.  Result per radiology.     X-ray calcaneus right, 2 views:  No evidence of fracture.  Result per radiology.     Laboratory:  CBC: WBC: 7.8, HGB: 11.6 L, PLT: 188  BMP: Calcium 8.2 L, o/w WNL (Creatinine: 0.97)  ABO/Rh type and screen: O+, antibody screen negative    Interventions:  1225 Sublimaze 100 mcg IV  1227 normal saline 1 L IV  1304 Dilaudid 0.5 mg IV  1342 Dilaudid 0.5 mg IV  1520 Toradol 15 mg IV  1523 Dilaudid 0.5 mg IV  1527 Ativan 0.5 mg IV   1630 Dilaudid 0.5 mg IV    Emergency Department Course:  Nursing notes and vitals reviewed.  I performed an exam of the patient as documented above.     IV inserted. Medicine administered as documented above. Blood drawn. This was sent to the lab for further testing, results above.    The patient was sent for a CT and XR while in the emergency department, findings above.     1512 I rechecked the patient and discussed the results of her workup thus far.  I discussed home disposition versus hospital admission for observation.    The patient was ambulated here in the emergency department with difficulty.  Increasing back pain, uncontrolled with medications provided thus far.      1603 I rechecked the patient.     1613: I discussed the case with Dr. David Evangelista for a trauma evaluation/consult; this will be completed in the OBS unit.  This is required by [place] State Trauma Guidelines.    4:32 PM   I consulted with Dr. Ortiz of the hospitalist services. She is in agreement to accept the patient for OBS.        Impression & Plan      Medical Decision Making:  This patient presents after a fall from approximately 10 feet as noted above.  She sustained multiple areas to her body with aches and pains.  There was no head injury or loss of responsiveness.  CT scan of the brain is not indicated at this time.  The patient has been observed by myself for over 4 hours and there is been no headache nausea  vomiting or mental status abnormality.  She did complain of some muscular neck pain which was quite lateral off of the spine, C-spine x-rays given the mechanism and distracting pain were performed according to the Nexus criteria and these are negative.  The patient had no cardiopulmonary or chest related symptoms, a chest x-ray was performed given the mechanism and this is negative.  The patient did not have significant abdominal pain but did have significant low back pain lumbar pain and bony pelvis discomfort.  CT scan of the abdomen and pelvis was performed to rule out solid organ injury and none is seen.  No fractures are identified in the lumbar spine or bony pelvis.  The patient had some extremity complaints including right proximal ulnar pain and right heel pain.  Both of these are x-rayed and are negative.  If there is ongoing right calcaneal pain a CT scan or MRI could be performed to look for a subtle nondisplaced fracture.  A postop 3D walker boot is placed at this time.  The patient can ambulate at this time but this does cause refractory back discomfort.  Most of her back pain is in the mid to low back on the left in the soft tissues.  The patient will be placed in observation status for pain control and PT/OT if needed.  The patient will be observed by Dr. Ortiz with a trauma evaluation/consultation by Dr. Evangelista from general surgery.  The GCS is 15.      Diagnosis:    ICD-10-CM    1. Acute left-sided low back pain without sciatica M54.5    2. Contusion of right elbow, initial encounter S50.01XA    3. Abrasion of right elbow, initial encounter S50.311A    4. Contusion of right heel, initial encounter S90.31XA    5. Strain of neck muscle, initial encounter S16.1XXA        Disposition:  Admitted to obs    Kavon Nelson  7/20/2018    EMERGENCY DEPARTMENT  Kavon SOLIS, am serving as a scribe at 12:17 PM on 7/20/2018 to document services personally performed by Ulises Palumbo MD based on my  observations and the provider's statements to me.        Ulises Palumbo MD  07/20/18 [id number]

## 2018-07-20 NOTE — PHARMACY-ADMISSION MEDICATION HISTORY
Admission medication history interview status for the 7/20/2018  admission is complete. See EPIC admission navigator for prior to admission medications     Medication history source reliability:Good.  The patient was able to state the name and dose of each medication she takes.  She no longer takes escitalopram.     Actions taken by pharmacist (provider contacted, etc):None     Additional medication history information not noted on PTA med list :None    Medication reconciliation/reorder completed by provider prior to medication history? Yes    Time spent in this activity: 20 min    Prior to Admission medications    Medication Sig Last Dose Taking? Auth Provider   CLONAZEPAM PO Take 1 mg by mouth daily as needed for anxiety.  Takes prior to going into work for anxiety.  7/19/2018 at Unknown time Yes Unknown, Entered By History   LamoTRIgine (LAMICTAL PO) Take 200 mg by mouth daily 7/20/2018 at am Yes Unknown, Entered By History   propranolol (INDERAL) 10 MG tablet Take 10 mg by mouth 3 times daily  7/20/2018 at x1 Yes Reported, Patient   Sertraline HCl (ZOLOFT PO) Take 125 mg by mouth daily  7/20/2018 at am Yes Reported, Patient   hydrOXYzine (ATARAX) 25 MG tablet Take 1-2 tablets (25-50 mg) by mouth every 6 hours as needed for itching or anxiety PRN  Oh Smith MD

## 2018-07-20 NOTE — H&P
Admitted:     07/20/2018      HISTORY OF PRESENT ILLNESS:  The patient is 27 years old.  She was at work this morning, she was trying to get a few things out of the attic and she fell from a 10-feet high metal drops ceiling.  As a result of the fall, she held her head with her both hands because she has had a concussion before,( twice in school,) and she stopped the fall with her right elbow, but she had left-sided neck pain, right arm bruising, back pain and right heel pain.  She denied any loss of consciousness.  In the emergency room, she was accompanied by her mother as well as her fiancee.  Further workup was done.  We tried to control the pain.  The patient was scared, tearful, multiple imaging studies were done.  X-ray of the elbow was without any fracture.  There is a local hematoma.  CT of the lumbar spine is without contrast and is negative for any spine injury.  CT of the abdomen and pelvis are without any traumatic abnormality.  Chest x-ray is without any rib fracture, calcaneal x-rays are also negative.  The patient's hemoglobin is 11.6, platelets 188.  WBC 7800.  Calcium slightly low at 8.2, blood screen AB O positive.  We have tried to control the pain, but patient continues to be in significant pain and it is considered proper to admit her to the hospital for further observation,  evaluation and management.   10 point ROS of systems including Constitutional, Eyes, Respiratory, Cardiovascular, Gastroenterology, Genitourinary, Integumentary, Muscularskeletal, Psychiatric were all negative except for pertinent positives noted in my HPI.  PAST MEDICAL HISTORY:  Depression and anxiety.      SURGICAL HISTORY:  Negative except for a couple of concussions and electrocution in March also.      FAMILY HISTORY:  Her father has passed away.  Her mother is alive and well.  She has 2 siblings who are younger than her and they both have anxiety.      SOCIAL HISTORY:  The patient does not smoke.  Her drinking is  moderate, occasionally 2 drinks once a month or so, but occasional marijuana use as well in the form of smoking.      PHYSICAL EXAMINATION:  The patient is very comfortable now after Dilaudid injection but complains of pain with every movement.     VITAL SIGNS:  Her temperature is 98, blood pressure 115/74, O2 saturation 100%, pulse 68 and respirations 16.   HEENT:  Unremarkable and pupils are reactive.   LUNGS:  Clear to auscultation.   CARDIOVASCULAR:  Normal S1 and S2, no gallop or murmur.   ABDOMEN:  Soft, nontender.   EXTREMITIES:  No edema. Very tender every where  PSYCHIATRIC:  Examination reveals that she is pleasant, tearful easily, and insight and judgment is intact.     JOINTS:  Joint examination reveals right arm hematoma, right foot is also a little bit swollen around the heel area, and ecchymosis.  Otherwise unremarkable.  Spine is unremarkable and the hips are unremarkable.  Range of movement at these joints is also normal.        IMAGING AND LABORATORY DATA:  As mentioned above and basic metabolic panel reveals sodium 140, potassium 3.9, blood sugar was 89.      ASSESSMENT AND PLAN:   1.  Fall with multiple injuries, calcaneal and spine imaging are all negative.  The patient is in significant pain.  It is considered proper to admit her for further evaluation and management.  She will be seen by the trauma surgeon, Dr. Evangelista, in the hospital who has been contacted by the emergency room physician.   2.  Mild anemia.  This will require further workup.  In May when she presented to the Emergency Room, her hemoglobin was 11.7.  She has a small hematoma and a little drop in hemoglobin could be from the blood loss.   3.  Known depression on Zoloft and Lamictal, and also using marijuana.  We will do a urine drug screen.   4.  Pain control.  The patient will be given Dilaudid IV p.r.n., hydrocodone orally p.r.n. and Tylenol or ibuprofen p.r.n.  Ice packs will be used locally.  We will also use DVT  prophylaxis with Lovenox.  Expected stay in the hospital is 2 days or less.  The patient agrees with the above plan.         GABRIELLA LINDSEY MD             D: 2018   T: 2018   MT: ELENA      Name:     CHARLY ADAMS   MRN:      [id number]        Account:      [id number]   :      1990        Admitted:     2018                   Document: [id number]

## 2018-07-20 NOTE — IP AVS SNAPSHOT
MRN:[id number]                      After Visit Summary   7/20/2018    Glenys Vuong    MRN: [id number]           Thank you!     Thank you for choosing [facility] for your care. Our goal is always to provide you with excellent care. Hearing back from our patients is one way we can continue to improve our services. Please take a few minutes to complete the written survey that you may receive in the mail after you visit with us. Thank you!        Patient Information     Date Of Birth          1990        About your hospital stay     You were admitted on:  July 20, 2018 You last received care in the:  [facility]    You were discharged on:  July 21, 2018        Reason for your hospital stay       Fall from 10 feet with associated injuries.                  Who to Call     For medical emergencies, please call 911.  For non-urgent questions about your medical care, please call your primary care provider or clinic, None          Attending Provider     Provider Ulises Erazo MD Emergency Medicine    Diane Ortiz MD Internal Medicine    Han Calzada DO Internal Medicine       Primary Care Provider Fax #    Physician No Ref-Primary [phone]      After Care Instructions     Activity       Your activity upon discharge: activity as tolerated, no driving while on narcotic pain medications            Diet       Follow this diet upon discharge: Orders Placed This Encounter      Regular Diet Adult                  Follow-up Appointments     Follow-up and recommended labs and tests        Follow up with primary care provider, within 7 days for hospital follow- up.  No follow up labs or test are needed.      Follow up with orthopedics if continued pain in right foot.                  Additional Services     Physical Therapy Referral       *This therapy referral will be filtered to a centralized scheduling office at [facility] and the patient will  "receive a call to schedule an appointment at a [place] location most convenient for them. *     [facility] provides Physical Therapy evaluation and treatment and many specialty services across the [place] system.  If requesting a specialty program, please choose from the list below.    If you have not heard from the scheduling office within 2 business days, please call [phone] for all locations, with the exception of [place], please call [phone] and [facility], please call [phone]  Treatment: Evaluation & Treatment  Special Instructions/Modalities: evaluation & treatment  Special Programs: None    Please be aware that coverage of these services is subject to the terms and limitations of your health insurance plan.  Call member services at your health plan with any benefit or coverage questions.      **Note to Provider:  If you are referring outside of [place] for the therapy appointment, please list the name of the location in the \"special instructions\" above, print the referral and give to the patient to schedule the appointment.                  Pending Results     No orders found for last 3 day(s).            Statement of Approval     Ordered          07/21/18 1559  I have reviewed and agree with all the recommendations and orders detailed in this document.  EFFECTIVE NOW     Approved and electronically signed by:  Murtaza Lindsey PA-C             Admission Information     Date & Time Provider Department Dept. Phone    7/20/2018 Han Calzada DO [facility] [phone]      Your Vitals Were     Blood Pressure Pulse Temperature Respirations Weight Pulse Oximetry    101/69 (BP Location: Right arm) 53 97.5  F (36.4  C) (Oral) 16 86.2 kg (190 lb) 99%    BMI (Body Mass Index)                   32.61 kg/m2           MyChart Information     [facility] gives you secure access to your electronic health record. If you see a primary care provider, you can also send " messages to your care team and make appointments. If you have questions, please call your primary care clinic.  If you do not have a primary care provider, please call [phone] and they will assist you.        Care EveryWhere ID     This is your Care EveryWhere ID. This could be used by other organizations to access your [facility] medical records  [id number]        Equal Access to Services     LEXX MELGAR : Hadii juan alberto figueroa Soadelfo, waaxda luqadaha, qaybta kaalmada lesterda, evy cottrelllouannwalter martin . So [facility] [phone].    ATENCIÓN: Si habla español, tiene a hastings disposición servicios gratuitos de asistencia lingüística. Eri al [phone].    We comply with applicable federal civil rights laws and [place] laws. We do not discriminate on the basis of race, color, national origin, age, disability, sex, sexual orientation, or gender identity.               Review of your medicines      START taking        Dose / Directions    HYDROcodone-acetaminophen 5-325 MG per tablet   Commonly known as:  NORCO        Dose:  1-2 tablet   Take 1-2 tablets by mouth every 4 hours as needed for other (pain control or improvement in physical function. Hold dose for analgesic side effects.)   Quantity:  10 tablet   Refills:  0       order for DME   Used for:  Contusion of right heel, initial encounter        Equipment being ordered: Crutches () Treatment Diagnosis: Impaired ambulation, right LE pain   Quantity:  1 each   Refills:  0         CONTINUE these medicines which have NOT CHANGED        Dose / Directions    CLONAZEPAM PO        Dose:  1 mg   Take 1 mg by mouth daily as needed for anxiety   Refills:  0       hydrOXYzine 25 MG tablet   Commonly known as:  ATARAX        Dose:  25-50 mg   Take 1-2 tablets (25-50 mg) by mouth every 6 hours as needed for itching or anxiety   Quantity:  60 tablet   Refills:  1       LAMICTAL PO        Dose:  200 mg   Take 200 mg by mouth daily   Refills:  0        propranolol 10 MG tablet   Commonly known as:  INDERAL        Dose:  10 mg   Take 10 mg by mouth 3 times daily   Refills:  0       ZOLOFT PO        Dose:  125 mg   Take 125 mg by mouth daily   Refills:  0            Where to get your medicines      Some of these will need a paper prescription and others can be bought over the counter. Ask your nurse if you have questions.     Bring a paper prescription for each of these medications     HYDROcodone-acetaminophen 5-325 MG per tablet    order for DME                Protect others around you: Learn how to safely use, store and throw away your medicines at www.disposemymeds.org.        Information about OPIOIDS     PRESCRIPTION OPIOIDS: WHAT YOU NEED TO KNOW   We gave you an opioid (narcotic) pain medicine. It is important to manage your pain, but opioids are not always the best choice. You should first try all the other options your care team gave you. Take this medicine for as short a time (and as few doses) as possible.     These medicines have risks:    DO NOT drive when on new or higher doses of pain medicine. These medicines can affect your alertness and reaction times, and you could be arrested for driving under the influence (DUI). If you need to use opioids long-term, talk to your care team about driving.    DO NOT operate heave machinery    DO NOT do any other dangerous activities while taking these medicines.     DO NOT drink any alcohol while taking these medicines.      If the opioid prescribed includes acetaminophen, DO NOT take with any other medicines that contain acetaminophen. Read all labels carefully. Look for the word  acetaminophen  or  Tylenol.  Ask your pharmacist if you have questions or are unsure.    You can get addicted to pain medicines, especially if you have a history of addiction (chemical, alcohol or substance dependence). Talk to your care team about ways to reduce this risk.    Store your pills in a secure place, locked if possible. We  will not replace any lost or stolen medicine. If you don t finish your medicine, please throw away (dispose) as directed by your pharmacist. The [place] Pollution Control Agency has more information about safe disposal: https://www.pca.[facility].mn.us/living-green/managing-unwanted-medications.     All opioids tend to cause constipation. Drink plenty of water and eat foods that have a lot of fiber, such as fruits, vegetables, prune juice, apple juice and high-fiber cereal. Take a laxative (Miralax, milk of magnesia, Colace, Senna) if you don t move your bowels at least every other day.              Medication List: This is a list of all your medications and when to take them. Check marks below indicate your daily home schedule. Keep this list as a reference.      Medications           Morning Afternoon Evening Bedtime As Needed    CLONAZEPAM PO   Take 1 mg by mouth daily as needed for anxiety                                HYDROcodone-acetaminophen 5-325 MG per tablet   Commonly known as:  NORCO   Take 1-2 tablets by mouth every 4 hours as needed for other (pain control or improvement in physical function. Hold dose for analgesic side effects.)   Last time this was given:  1 tablet on 7/21/2018  9:25 AM                                hydrOXYzine 25 MG tablet   Commonly known as:  ATARAX   Take 1-2 tablets (25-50 mg) by mouth every 6 hours as needed for itching or anxiety                                LAMICTAL PO   Take 200 mg by mouth daily   Last time this was given:  200 mg on 7/21/2018  8:25 AM                                order for DME   Equipment being ordered: Crutches () Treatment Diagnosis: Impaired ambulation, right LE pain                                propranolol 10 MG tablet   Commonly known as:  INDERAL   Take 10 mg by mouth 3 times daily   Last time this was given:  10 mg on 7/21/2018  1:58 PM                                ZOLOFT PO   Take 125 mg by mouth daily   Last time this was given:  125  mg on 7/21/2018  9:26 AM

## 2018-07-20 NOTE — IP AVS SNAPSHOT
[facility]    [address] [place] [id number]    Phone:  [phone]                                       After Visit Summary   7/20/2018    Glenys Vuong    MRN: [id number]           After Visit Summary Signature Page     I have received my discharge instructions, and my questions have been answered. I have discussed any challenges I see with this plan with the nurse or doctor.    ..........................................................................................................................................  Patient/Patient Representative Signature      ..........................................................................................................................................  Patient Representative Print Name and Relationship to Patient    ..................................................               ................................................  Date                                            Time    ..........................................................................................................................................  Reviewed by Signature/Title    ...................................................              ..............................................  Date                                                            Time

## 2018-07-21 ENCOUNTER — APPOINTMENT (OUTPATIENT)
Dept: GENERAL RADIOLOGY | Facility: CLINIC | Age: 28
DRG: 552 | End: 2018-07-21
Attending: PHYSICIAN ASSISTANT
Payer: OTHER MISCELLANEOUS

## 2018-07-21 ENCOUNTER — APPOINTMENT (OUTPATIENT)
Dept: PHYSICAL THERAPY | Facility: CLINIC | Age: 28
DRG: 552 | End: 2018-07-21
Attending: INTERNAL MEDICINE
Payer: OTHER MISCELLANEOUS

## 2018-07-21 VITALS
DIASTOLIC BLOOD PRESSURE: 69 MMHG | OXYGEN SATURATION: 99 % | TEMPERATURE: 97.5 F | RESPIRATION RATE: 16 BRPM | BODY MASS INDEX: 32.61 KG/M2 | SYSTOLIC BLOOD PRESSURE: 101 MMHG | HEART RATE: 53 BPM | WEIGHT: 190 LBS

## 2018-07-21 PROCEDURE — 99207 ZZC CDG-CODE CATEGORY CHANGED: CPT | Performed by: PHYSICIAN ASSISTANT

## 2018-07-21 PROCEDURE — 97161 PT EVAL LOW COMPLEX 20 MIN: CPT | Mod: GP

## 2018-07-21 PROCEDURE — 99239 HOSP IP/OBS DSCHRG MGMT >30: CPT | Performed by: PHYSICIAN ASSISTANT

## 2018-07-21 PROCEDURE — 73630 X-RAY EXAM OF FOOT: CPT | Mod: RT

## 2018-07-21 PROCEDURE — 25000128 H RX IP 250 OP 636: Performed by: INTERNAL MEDICINE

## 2018-07-21 PROCEDURE — G0378 HOSPITAL OBSERVATION PER HR: HCPCS

## 2018-07-21 PROCEDURE — 25000132 ZZH RX MED GY IP 250 OP 250 PS 637: Performed by: INTERNAL MEDICINE

## 2018-07-21 PROCEDURE — 40000193 ZZH STATISTIC PT WARD VISIT

## 2018-07-21 RX ORDER — HYDROCODONE BITARTRATE AND ACETAMINOPHEN 5; 325 MG/1; MG/1
1-2 TABLET ORAL EVERY 4 HOURS PRN
Qty: 10 TABLET | Refills: 0 | Status: SHIPPED | OUTPATIENT
Start: 2018-07-21 | End: 2019-06-05

## 2018-07-21 RX ADMIN — PROPRANOLOL HYDROCHLORIDE 10 MG: 10 TABLET ORAL at 09:28

## 2018-07-21 RX ADMIN — SODIUM CHLORIDE: 9 INJECTION, SOLUTION INTRAVENOUS at 04:31

## 2018-07-21 RX ADMIN — LAMOTRIGINE 200 MG: 100 TABLET ORAL at 08:25

## 2018-07-21 RX ADMIN — HYDROCODONE BITARTRATE AND ACETAMINOPHEN 1 TABLET: 5; 325 TABLET ORAL at 09:25

## 2018-07-21 RX ADMIN — SERTRALINE HYDROCHLORIDE 125 MG: 100 TABLET ORAL at 09:26

## 2018-07-21 RX ADMIN — HYDROCODONE BITARTRATE AND ACETAMINOPHEN 1 TABLET: 5; 325 TABLET ORAL at 01:08

## 2018-07-21 RX ADMIN — PROPRANOLOL HYDROCHLORIDE 10 MG: 10 TABLET ORAL at 13:58

## 2018-07-21 RX ADMIN — ACETAMINOPHEN 650 MG: 325 TABLET, FILM COATED ORAL at 13:54

## 2018-07-21 NOTE — PLAN OF CARE
Problem: Patient Care Overview  Goal: Plan of Care/Patient Progress Review  Outcome: No Change  VSS, back pain 5/10 managed with Norco (refusing hydroxyzine which makes her too sleepy, and ibuprofen). Strict bedrest maintained. Boot in place. Pt using bed pan, voiding adequately. Nausea after norco resolved. Pt reporting right foot numbness, pulse +, cap refill <3, pink, warm. Phyllis diet.

## 2018-07-21 NOTE — TREATMENT PLAN
[facility].               [facility].                [phone]        To whom it may concern:    Due to a recent fall at work, and the injuries sustained, Mrs. Glenys Vuong ( 1990) will need to rest at home for the next week to two weeks.    Additional information will be provided when upcoming outpatient evaluation is obtained.    Best Regards      David Evangelista MD  18

## 2018-07-21 NOTE — PLAN OF CARE
AVSS;  Pt's back and right heel pain relieved with Norco; IV NS at 100 ml/hr; pt on strict bedrest  voided on bedpan.  Right leg with boot in place; CMS intact; continue to monitor.

## 2018-07-21 NOTE — PROVIDER NOTIFICATION
MD Notification    Notified Person: MD    Notified Person Name: Diana    Notification Date/Time: 7/20/2018 8:44 PM    Notification Interaction: phone    Purpose of Notification: clarify boot orders, inderal order with pressure 95/60, 50, right foot numb    Orders Received: leave boot in place until the a.m. will re-assess and ambulate if medically stable. Give inderal.     Comments:  Continue to monitor foot CMS and notify for any change.

## 2018-07-21 NOTE — PROGRESS NOTES
gen surge note    Patient is doing well, tolerating diet and pain under control.  Additional xrays of right foot show no fracture  Recommend outpatient evaluation with orthopedic surgery in the next week or so.    Going home today, agree.

## 2018-07-21 NOTE — PLAN OF CARE
"Problem: Patient Care Overview  Goal: Plan of Care/Patient Progress Review  Outcome: No Change  AVSS;  Pt's back and right heel pain relieved with Norco; IV NS at 100 ml/hr; pt on strict bedrest overnight; voided on bedpan on pm shift; refusing to use bedpan overnight; stated, \"I don't have to go right now.\"; right leg with boot in place; CMS intact; continue to monitor.      "

## 2018-07-21 NOTE — PROGRESS NOTES
AVSS;  Pt's back and right heel pain relieved with Norco; pt ambulated in room with boot on right foot. CMS intact; continue to monitor. Plan for discharge later today.

## 2018-07-21 NOTE — PLAN OF CARE
Problem: Patient Care Overview  Goal: Plan of Care/Patient Progress Review  Acute Traumatic Pain     1.  Pain controlled with oral analgesia: yes    2.  Vital signs stable: Yes      3.  Diagnotic testing complete: Yes      4.  Cleared from consultants (if applicable): No      5. Return to near baseline physical activity: No

## 2018-07-21 NOTE — PLAN OF CARE
Problem: Patient Care Overview  Goal: Plan of Care/Patient Progress Review  Acute Traumatic Pain     1.  Pain controlled with oral analgesia: yes    2.  Vital signs stable: Yes      3.  Diagnotic testing complete: Partially met      4.  Cleared from consultants (if applicable): No      5. Return to near baseline physical activity: No

## 2018-07-21 NOTE — DISCHARGE SUMMARY
[facility]    Discharge Summary  Hospitalist    Date of Admission:  7/20/2018  Date of Discharge:  7/21/2018  Discharging Provider: Murtaza Lindsey PA-C    Discharge Diagnoses      Acute left-sided low back pain without sciatica  Contusion of right elbow, initial encounter  Abrasion of right elbow, initial encounter  Contusion of right heel, initial encounter  Strain of neck muscle, initial encounter  Fall, initial encounter    History of Present Illness   Glenys Vuong is an 27 year old female who presented following a fall 10 feet from a drop ceiling.    Hospital Course   Glenys Vuong was admitted on 7/20/2018.  The following problems were addressed during her hospitalization:    Status-post fall   Pt fell 10 feet from a drop ceiling, landing on her right side. No head trauma, LOC. Pt was evaluated in ED with CT A/P, CT lumbar spine, cervical spine, right heel and elbow plain films, CXR which were all negative for injury. Patient had ongoing generalized pain & discomfort with localization to right heel, thus was admitted under observation status for pain management. She was placed in a CAM boot for comfort and able to bear weight. The following day she had ongoing discomfort to her lateral right foot, therefore repeat films were obtained of foot and heel, which were negative. Pain was managed with PRN norco. She was evaluated by trauma without further imaging required, recommended to follow-up with orthopedics if heel pain not improved. She will remain in the CAM boot for comfort, instructed to remove while resting and in bed. She was discharged home in stable condition, tolerating a regular diet, with prescription for further norco.    Mild anemia.  Chronic and stable. Hgb 11.6 on admission, appears to have baseline around 11. Further monitoring per PCP.     Depression Continues PTA Zoloft and Lamictal, also using marijuana PTA. Utox positive for bzd, opiates, and cannabis. Pt did receive pain  medication prior to evaluation.    Murtaza Lindsey PA-C    Significant Results and Procedures   As noted    Pending Results   These results will be followed up by n/a  Unresulted Labs Ordered in the Past 30 Days of this Admission     No orders found for last 61 day(s).          Code Status   Full Code       Primary Care Physician   Physician No Ref-Primary    Physical Exam   Temp: 97.5  F (36.4  C) Temp src: Oral BP: 101/69 Pulse: 53 Heart Rate: 47 Resp: 16 SpO2: 99 % O2 Device: None (Room air)    Vitals:    07/20/18 1146   Weight: 86.2 kg (190 lb)     Vital Signs with Ranges  Temp:  [95.5  F (35.3  C)-97.5  F (36.4  C)] 97.5  F (36.4  C)  Pulse:  [50-57] 53  Heart Rate:  [47-51] 47  Resp:  [16] 16  BP: ()/(40-73) 101/69  SpO2:  [97 %-100 %] 99 %  I/O last 3 completed shifts:  In: 1800 [I.V.:800; IV Piggyback:1000]  Out: 200 [Urine:200]    GEN: well-developed, well-nourished, appears comfortable  HEENT: NCAT, PERRLA, EOMI bilat without nystagmus, nose/mouth patent, mmm  NECK: supple, nontender to palpation of spinous processes or paraspinal tissues, full AROM in all fields without pain, nontender with flexion/extension, axial load/distraction  PULM: lungs CTA bilaterally, no increased work of breathing, no wheeze, rales, rhonchi  CV: RRR, S1 & S2, no murmur  GI: soft, nontender, nondistended, no guarding or rigidity, +BS in all 4 quadrants  NEURO: awake, A&Ox3, appropriate, CN II-XII intact & symmetric bilaterally, sensation grossly intact  BACK: tender to palpation overlying right glute & SI joint, nontender to palpation of spinous processes, no stepoffs or deformities  MSK: R ankle in CAM boot, skin intact, tender to palpation of lateral foot along proximal 5th metatarsal and lateral calcaneous, no ecchymosis or contusion appreciated, pedal pulses palpable, 2+ bilaterally; moving all extremities spontaneously and symmetrically  SKIN: warm & dry without rash; contusion to right lateral proximal  forearm    Discharge Disposition   Discharged to home  Condition at discharge: Stable    Consultations This Hospital Stay   PHYSICAL THERAPY ADULT IP CONSULT  OCCUPATIONAL THERAPY ADULT IP CONSULT  TRAUMA SURGERY IP CONSULT    Time Spent on this Encounter   I, Murtaza Lindsey, personally saw the patient today and spent greater than 30 minutes discharging this patient.    Discharge Orders     Physical Therapy Referral     Reason for your hospital stay   Fall from 10 feet with associated injuries.     Follow-up and recommended labs and tests    Follow up with primary care provider, within 7 days for hospital follow- up.  No follow up labs or test are needed.      Follow up with orthopedics if continued pain in right foot.     Activity   Your activity upon discharge: activity as tolerated, no driving while on narcotic pain medications     Diet   Follow this diet upon discharge: Orders Placed This Encounter     Regular Diet Adult       Discharge Medications   Current Discharge Medication List      START taking these medications    Details   HYDROcodone-acetaminophen (NORCO) 5-325 MG per tablet Take 1-2 tablets by mouth every 4 hours as needed for other (pain control or improvement in physical function. Hold dose for analgesic side effects.)  Qty: 10 tablet, Refills: 0    Associated Diagnoses: Fall, initial encounter      order for DME Equipment being ordered: Crutches ()  Treatment Diagnosis: Impaired ambulation, right LE pain  Qty: 1 each, Refills: 0    Associated Diagnoses: Contusion of right heel, initial encounter         CONTINUE these medications which have NOT CHANGED    Details   CLONAZEPAM PO Take 1 mg by mouth daily as needed for anxiety      LamoTRIgine (LAMICTAL PO) Take 200 mg by mouth daily      propranolol (INDERAL) 10 MG tablet Take 10 mg by mouth 3 times daily       Sertraline HCl (ZOLOFT PO) Take 125 mg by mouth daily       hydrOXYzine (ATARAX) 25 MG tablet Take 1-2 tablets (25-50 mg) by mouth every  6 hours as needed for itching or anxiety  Qty: 60 tablet, Refills: 1           Allergies   No Known Allergies  Data   Most Recent 3 CBC's:  Recent Labs   Lab Test  07/20/18   1306  05/11/18   1528  04/02/16   1229   WBC  7.8  6.6  12.1*   HGB  11.6*  11.7  12.4   MCV  85  85  78   PLT  188  162  301      Most Recent 3 BMP's:  Recent Labs   Lab Test  07/20/18   1306  05/11/18   1528  04/02/16   1229   NA  140  138  141   POTASSIUM  3.9  3.7  3.8   CHLORIDE  106  108  107   CO2  27  24  27   BUN  12  13  8   CR  0.97  0.94  0.99   ANIONGAP  7  6  7   TITO  8.2*  8.0*  8.9   GLC  89  104*  92     Most Recent 2 LFT's:  Recent Labs   Lab Test  05/11/18   1528   AST  14   ALT  14   ALKPHOS  48   BILITOTAL  0.3     Most Recent INR's and Anticoagulation Dosing History:  Anticoagulation Dose History     There is no flowsheet data to display.        Most Recent 3 Troponin's:  Recent Labs   Lab Test  05/11/18   1528  04/02/16   1229   TROPI  <0.015  <0.015  The 99th percentile for upper reference range is 0.045 ug/L.  Troponin values in   the range of 0.045 - 0.120 ug/L may be associated with risks of adverse   clinical events.       Most Recent Cholesterol Panel:No lab results found.  Most Recent 6 Bacteria Isolates From Any Culture (See EPIC Reports for Culture Details):No lab results found.  Most Recent TSH, T4 and A1c Labs:No lab results found.  Results for orders placed or performed during the hospital encounter of 07/20/18   XR Cervical Spine 2/3 Views    Narrative    XR CERVICAL SPINE 2/3 VWS 7/20/2018 1:00 PM     HISTORY: trauma, fx;     COMPARISON: None      Impression    IMPRESSION: No evidence of fracture or malalignment.    BERNARDA HAQUE MD   XR Calcaneus Right G/E 2 Views    Narrative    XR CALCANEUS RT G/E 2 VW 7/20/2018 12:59 PM     HISTORY: fall, pain, fx;     COMPARISON: None      Impression    IMPRESSION: No evidence of fracture.    BERNARDA HAQUE MD   Abd/pelvis CT,  IV  contrast only TRAUMA / AAA     Narrative    CT ABDOMEN AND PELVIS WITH CONTRAST   7/20/2018 2:37 PM     HISTORY: Trauma, right mid/low back pain, solid organ injury.     TECHNIQUE:  CT abdomen and pelvis with 95 mL Isovue-370 IV. Radiation  dose for this scan was reduced using automated exposure control,  adjustment of the mA and/or kV according to patient size, or iterative  reconstruction technique.    COMPARISON: None.    FINDINGS:  Abdomen: There is mild dependent atelectasis at the left lung base. No  pneumothorax. The heart size is normal. There is mild geographic fatty  infiltration in the left lobe of the liver. The spleen, gallbladder,  pancreas, adrenal glands and kidneys are normal in appearance. There  is no abdominal or pelvic lymph node enlargement.    Pelvis: Uterus and adnexa appear normal. There is small amount of free  fluid in the pelvis, within physiologic limits. There is no bowel  obstruction or inflammation. No free intraperitoneal gas. There is no  acute bone fracture.      Impression    IMPRESSION: No acute traumatic abnormality.    LUIS FROST MD   Lumbar spine CT w/o contrast    Narrative    CT LUMBAR SPINE WITHOUT CONTRAST  7/20/2018 2:38 PM     HISTORY:  Fall, right lumbar pain and iliac pain. Fracture.     TECHNIQUE: Axial images of the lumbar spine were obtained without  intravenous contrast. Multiplanar reformations were performed.   Radiation dose for this scan was reduced using automated exposure  control, adjustment of the mA and/or kV according to patient size, or  iterative reconstruction technique.    COMPARISON: None.    FINDINGS:  Alignment is maintained. Intervertebral disc and facet  joints are intact. No evidence of acute fracture or traumatic  subluxation. No evidence of acute traumatic disc herniation or  epidural hematoma. No foraminal or spinal canal stenosis. Paraspinal  soft tissues are unremarkable.      Impression    IMPRESSION:  No evidence of acute lumbar spine injury.     NATASHA RODAS,  MD   XR Chest 2 Views    Narrative    XR CHEST 2 VW 7/20/2018 12:59 PM     HISTORY: trauma, pneumothorax;     COMPARISON: 4/2/2016      Impression    IMPRESSION: The cardiac silhouette and pulmonary vasculature are  within normal limits. No evidence of pneumothorax or pleural effusion.  The lungs are clear.    BERNARDA HAQUE MD   Elbow XR, G/E 3 views, right    Narrative    XR ELBOW RT G/E 3 VW -  7/20/2018 2:43 PM     HISTORY:  pain, fall, fracture; .     COMPARISON: None.      Impression    IMPRESSION: No evidence of fracture.     BERNARDA HAQUE MD   XR Foot Right G/E 3 Views    Narrative    FOOT THREE VIEWS RIGHT  7/21/2018 10:19 AM     HISTORY: Ongoing pain to lateral foot, rule out proximal 5th  metatarsal fracture. Include calcaneus.     COMPARISON: None.    FINDINGS: There is no significant degenerative change. The Lisfranc  joint appears unremarkable in these views. The plantar arch is  unremarkable in these views. There is no acute fracture or  dislocation. There are no worrisome bony lesions.      Impression    IMPRESSION: No acute osseous abnormality demonstrated.    BLAIR SANTO MD

## 2018-07-21 NOTE — PLAN OF CARE
Problem: Patient Care Overview  Goal: Plan of Care/Patient Progress Review  Physical Therapy: Order received, chart reviewed and evaluation completed. Pt is a 27 year old female under OBS s/p fall from a height of 8-10 feet, resulting in right heel, cervical and low back pain. At baseline, pt reports that she lives with her significant other in an apartment with steps to enter. Pt reports independence with mobility at baseline.    Discharge Planner PT   Patient plan for discharge: Home  Current status: Pt independent with bed mobility, good sitting balance at EOB. Pt performs sit to/from stand independently. Pt ambulates 50' without an AD independently, however, antalgic with right WBing. Pt ambulates 100' with axillary crutches modified independent with less pain reported. Pt demonstrates ability to ascend/descend 16 steps with handrail modified independently.  Barriers to return to prior living situation: None anticipated  Recommendations for discharge: Home with significant other  Rationale for recommendations: Pt independent with mobility, should be safe to discharge home. Recommend use of axillary crutches to relieve pain with WBing thru [facility].       Entered by: Daniella Reyes 07/21/2018 11:57 AM      Will plan to discharge patient from PT and complete PT order as patient is under OBS status and discharge recommendations are listed above.

## 2018-07-21 NOTE — PROGRESS NOTES
Pt discharged to home via private car accompanied by friend. Pt was given work note and states understanding of discharge instructions. Prescription filled and sent with pt. Crutches were also sent with pt. No further needs were noted.

## 2018-07-31 ENCOUNTER — HOSPITAL ENCOUNTER (OUTPATIENT)
Dept: PHYSICAL THERAPY | Facility: CLINIC | Age: 28
Setting detail: THERAPIES SERIES
End: 2018-07-31
Attending: PHYSICIAN ASSISTANT
Payer: OTHER MISCELLANEOUS

## 2018-07-31 PROCEDURE — 40000718 ZZHC STATISTIC PT DEPARTMENT ORTHO VISIT: Performed by: PHYSICAL THERAPIST

## 2018-07-31 PROCEDURE — 97161 PT EVAL LOW COMPLEX 20 MIN: CPT | Mod: GP | Performed by: PHYSICAL THERAPIST

## 2018-07-31 PROCEDURE — 97140 MANUAL THERAPY 1/> REGIONS: CPT | Mod: GP | Performed by: PHYSICAL THERAPIST

## 2018-07-31 PROCEDURE — 97110 THERAPEUTIC EXERCISES: CPT | Mod: GP | Performed by: PHYSICAL THERAPIST

## 2018-08-01 NOTE — PROGRESS NOTES
07/31/18 1400   Quick Adds   Type of Visit Initial OP PT Evaluation   General Information   Start of Care Date 07/31/18   Referring Physician Murtaza Lindsey PA-C   Orders Evaluate and Treat as Indicated   Order Date 07/21/18   Medical Diagnosis Fall, initial encounter, Contusion R heel, initial encounter, Acute L side LBP without sciatica.    Onset of illness/injury or Date of Surgery 07/20/18   Surgical/Medical history reviewed Yes   Pertinent history of current problem (include personal factors and/or comorbidities that impact the POC) Fall from 15 ft drop ceiling at work, Went down ladder and landed all on her R side, first landing on R heel and then legs went up. Xray of foot was negative. Put in CAM boot and was told to be WBAT.  Having pain in R mid back to upper pelvis. Some pain in R buttock and goes down leg if she sleeps weird.  Not driving yet and not back to work yet.  Just taking Tylenol but has not been taking it.  Has been starting to walk around house without boot. Wakes up every hour due to back pain.  Also having some L sided neck pain that is giving her bad headaches. Feels like she can't stretch it out enough.  Will be following up with ortho to find out when to take off CAM boot.    Prior level of function comment Independent. Working and able to drive. Weightlifting and sports.    Patient role/Employment history Employed   Home/Community Accessibility Comments Lives with fiance.   Current Assistive Devices Axillary Crutches   Patient/Family Goals Statement decrease back and neck pain.    General Information Comments Brother accompanied her today to appt.    Fall Risk Screen   Have you fallen 2 or more times in the past year? No   Pain   Pain comments R mid to low back area: With movement 8.5/10 and at rest 7/10.    Cognitive Status Examination   Level of Consciousness alert   Palpation   Palpation Client sensitive and painful to touch from R midback around bra strap down to pelvic crest. . No  knots noted   Range of Motion (ROM)   ROM Comment CROM WFL. Lumbar AROM  WFL except for extension. and rotation.   Strength   Strength Comments Not tested.    Bed Mobility   Bed Mobility Comments independent   Transfer Skills   Transfer Comments independent   Gait   Gait Comments Ambulates with R CAM boot on and brother carrying crutches.     Balance   Balance Comments not tested.    Sensory Examination   Sensory Perception Comments Denies tingling or numbness   Modality Interventions   Planned Modality Interventions Comments mechanical traction, US, e-stim, hot/cold   Planned Therapy Interventions   Planned Therapy Interventions ROM;strengthening;stretching;manual therapy   Clinical Impression   Criteria for Skilled Therapeutic Interventions Met yes, treatment indicated   PT Diagnosis Back and neck pain   Influenced by the following impairments pain, decreased extension ROM,    Functional limitations due to impairments unable to sleep through the night, unable to drive due to CAM boot and heel pain.    Clinical Presentation Stable/Uncomplicated   Clinical Presentation Rationale back and neck pain is variable, unable to sleep through the night, pain rating.    Clinical Decision Making (Complexity) Low complexity   Therapy Frequency 1 time/week   Predicted Duration of Therapy Intervention (days/wks) 30 days   Risk & Benefits of therapy have been explained Yes   Patient, Family & other staff in agreement with plan of care Yes   GOALS   PT Eval Goals 1;2   Goal 1   Goal Identifier Pain   Goal Description Client will rate pain in R mid back to 2 or less out of 10   Target Date 08/29/18   Goal 2   Goal Identifier HEP    Goal Description Client will be independent in HEP for stretching to help decrease back and neck pain.    Target Date 08/29/18   Total Evaluation Time   Total Evaluation Time (Minutes) 20

## 2019-06-05 ENCOUNTER — HOSPITAL ENCOUNTER (EMERGENCY)
Facility: CLINIC | Age: 29
Discharge: HOME | End: 2019-06-05
Attending: EMERGENCY MEDICINE | Admitting: EMERGENCY MEDICINE
Payer: COMMERCIAL

## 2019-06-05 VITALS
HEART RATE: 77 BPM | SYSTOLIC BLOOD PRESSURE: 111 MMHG | TEMPERATURE: 100.8 F | DIASTOLIC BLOOD PRESSURE: 63 MMHG | OXYGEN SATURATION: 99 %

## 2019-06-05 DIAGNOSIS — F43.10 PTSD (POST-TRAUMATIC STRESS DISORDER): ICD-10-CM

## 2019-06-05 DIAGNOSIS — F41.1 GENERALIZED ANXIETY DISORDER: ICD-10-CM

## 2019-06-05 DIAGNOSIS — F33.1 MODERATE EPISODE OF RECURRENT MAJOR DEPRESSIVE DISORDER (H): ICD-10-CM

## 2019-06-05 LAB
AMPHETAMINES UR QL SCN: NEGATIVE
BARBITURATES UR QL: NEGATIVE
BENZODIAZ UR QL: NEGATIVE
CANNABINOIDS UR QL SCN: POSITIVE
COCAINE UR QL: NEGATIVE
ETHANOL UR QL SCN: NEGATIVE
OPIATES UR QL SCN: NEGATIVE

## 2019-06-05 PROCEDURE — 90791 PSYCH DIAGNOSTIC EVALUATION: CPT

## 2019-06-05 PROCEDURE — 80320 DRUG SCREEN QUANTALCOHOLS: CPT | Performed by: FAMILY MEDICINE

## 2019-06-05 PROCEDURE — 99285 EMERGENCY DEPT VISIT HI MDM: CPT | Mod: 25

## 2019-06-05 PROCEDURE — 80307 DRUG TEST PRSMV CHEM ANLYZR: CPT | Performed by: FAMILY MEDICINE

## 2019-06-05 PROCEDURE — 99284 EMERGENCY DEPT VISIT MOD MDM: CPT | Mod: Z6 | Performed by: EMERGENCY MEDICINE

## 2019-06-05 ASSESSMENT — ENCOUNTER SYMPTOMS
SLEEP DISTURBANCE: 1
DYSPHORIC MOOD: 1
HALLUCINATIONS: 0
NERVOUS/ANXIOUS: 1

## 2019-06-05 NOTE — ED NOTES
"Pt here w/mom and fiance.  Pt states she has had multiple factors in her life that are increasing her depression. Pt states candido has been off work for almost a year with an injury and has had to have  help her fight for he employer to pay her.  Pt states she has lost her grandfather and her other grandfather has been dx w/cancer.  Pt states her sister and niece and nephew are in a bad living situation and it stresses her out.  Pts fiance states that she has been sleeping in bed for a week straight now and dealing with bouts of anger.  Pt states she wants to purchase a gun and shoot herself.  Pt also states \"I would never actually do that\"  "

## 2019-06-05 NOTE — ED NOTES
I have performed an in person assessment of the patient. Based on this assessment the patient no longer requires a one on one attendant at this point in time.    Murali Dominguez MD  6:00 PM  June 5, 2019         Murali Dominguez MD  06/05/19 [id number]

## 2019-06-05 NOTE — ED AVS SNAPSHOT
[facility], Emergency Department  [address]  [facility] [address]  Phone:  [phone]  Fax:  [phone]                                    Glenys Vuong   MRN: [id number]    Department:  [facility], Emergency Department   Date of Visit:  6/5/2019           After Visit Summary Signature Page    I have received my discharge instructions, and my questions have been answered. I have discussed any challenges I see with this plan with the nurse or doctor.    ..........................................................................................................................................  Patient/Patient Representative Signature      ..........................................................................................................................................  Patient Representative Print Name and Relationship to Patient    ..................................................               ................................................  Date                                   Time    ..........................................................................................................................................  Reviewed by Signature/Title    ...................................................              ..............................................  Date                                               Time          22EPIC Rev 08/18
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
## 2019-11-08 ENCOUNTER — RECORDS - HEALTHEAST (OUTPATIENT)
Dept: ADMINISTRATIVE | Facility: OTHER | Age: 29
End: 2019-11-08

## 2020-03-10 ENCOUNTER — HEALTH MAINTENANCE LETTER (OUTPATIENT)
Age: 30
End: 2020-03-10

## 2020-09-09 ENCOUNTER — AMBULATORY - HEALTHEAST (OUTPATIENT)
Dept: SURGERY | Facility: AMBULATORY SURGERY CENTER | Age: 30
End: 2020-09-09

## 2020-09-09 DIAGNOSIS — Z11.59 ENCOUNTER FOR SCREENING FOR OTHER VIRAL DISEASES: ICD-10-CM

## 2020-09-25 ENCOUNTER — AMBULATORY - HEALTHEAST (OUTPATIENT)
Dept: FAMILY MEDICINE | Facility: CLINIC | Age: 30
End: 2020-09-25

## 2020-09-25 ENCOUNTER — ANESTHESIA - HEALTHEAST (OUTPATIENT)
Dept: SURGERY | Facility: AMBULATORY SURGERY CENTER | Age: 30
End: 2020-09-25

## 2020-09-25 DIAGNOSIS — Z11.59 ENCOUNTER FOR SCREENING FOR OTHER VIRAL DISEASES: ICD-10-CM

## 2020-09-25 ASSESSMENT — MIFFLIN-ST. JEOR
SCORE: 1442.57
SCORE: 1442.57

## 2020-09-27 ENCOUNTER — COMMUNICATION - HEALTHEAST (OUTPATIENT)
Dept: SCHEDULING | Facility: CLINIC | Age: 30
End: 2020-09-27

## 2020-09-28 ENCOUNTER — SURGERY - HEALTHEAST (OUTPATIENT)
Dept: SURGERY | Facility: AMBULATORY SURGERY CENTER | Age: 30
End: 2020-09-28

## 2020-09-28 ENCOUNTER — HOSPITAL ENCOUNTER (OUTPATIENT)
Dept: SURGERY | Facility: AMBULATORY SURGERY CENTER | Age: 30
Discharge: HOME | End: 2020-09-28
Attending: ORTHOPAEDIC SURGERY | Admitting: ORTHOPAEDIC SURGERY
Payer: COMMERCIAL

## 2020-09-28 LAB
DIPSTICK EXPIRATION DATE - HISTORICAL: NORMAL
DIPSTICK LOT NUMBER - HISTORICAL: NORMAL
POC PREG URINE (HCG) HE - HISTORICAL: NEGATIVE
POC SPECIFIC GRAVITY, URINE - HISTORICAL: NORMAL
POCT KIT EXPIRATION DATE - HISTORICAL: NORMAL
POCT KIT LOT NUMBER HE - HISTORICAL: NORMAL
POCT NEGATIVE CONTROL HE - HISTORICAL: NORMAL
POCT POSITIVE CONTROL HE - HISTORICAL: NORMAL

## 2020-09-28 ASSESSMENT — MIFFLIN-ST. JEOR
SCORE: 1442.57
SCORE: 1442.57

## 2020-10-21 ENCOUNTER — COMMUNICATION - HEALTHEAST (OUTPATIENT)
Dept: SCHEDULING | Facility: CLINIC | Age: 30
End: 2020-10-21

## 2020-12-27 ENCOUNTER — HEALTH MAINTENANCE LETTER (OUTPATIENT)
Age: 30
End: 2020-12-27

## 2020-12-28 ENCOUNTER — TRANSFERRED RECORDS (OUTPATIENT)
Dept: HEALTH INFORMATION MANAGEMENT | Facility: CLINIC | Age: 30
End: 2020-12-28

## 2021-02-01 ENCOUNTER — HOSPITAL ENCOUNTER (EMERGENCY)
Facility: CLINIC | Age: 31
Discharge: HOME | End: 2021-02-01
Attending: NURSE PRACTITIONER | Admitting: NURSE PRACTITIONER
Payer: COMMERCIAL

## 2021-02-01 ENCOUNTER — APPOINTMENT (OUTPATIENT)
Dept: GENERAL RADIOLOGY | Facility: CLINIC | Age: 31
End: 2021-02-01
Attending: NURSE PRACTITIONER
Payer: COMMERCIAL

## 2021-02-01 VITALS
RESPIRATION RATE: 16 BRPM | SYSTOLIC BLOOD PRESSURE: 97 MMHG | OXYGEN SATURATION: 100 % | TEMPERATURE: 96.5 F | DIASTOLIC BLOOD PRESSURE: 60 MMHG | WEIGHT: 175 LBS | BODY MASS INDEX: 32.2 KG/M2 | HEIGHT: 62 IN | HEART RATE: 84 BPM
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
## 2021-02-01 PROCEDURE — 72100 X-RAY EXAM L-S SPINE 2/3 VWS: CPT

## 2021-02-01 PROCEDURE — 72202 X-RAY EXAM SI JOINTS 3/> VWS: CPT

## 2021-02-01 PROCEDURE — 99284 EMERGENCY DEPT VISIT MOD MDM: CPT

## 2021-02-01 ASSESSMENT — MIFFLIN-ST. JEOR: SCORE: 1472.04

## 2021-02-01 ASSESSMENT — ENCOUNTER SYMPTOMS
BACK PAIN: 1
VOMITING: 1
NAUSEA: 1

## 2021-02-02 NOTE — ED TRIAGE NOTES
Pt fell today skateboarding, onto tailbone and left shoulder. Pt has hx of back surgery and increase pain.

## 2021-02-02 NOTE — ED PROVIDER NOTES
"  History   Chief Complaint:  Back Pain and Fall     The history is provided by the patient and medical records.      Glenys Vuong is a 30 year old female s/p right lumbar 5 medial branch nerve transection and right sacral 1/2/3 lateral branch nerve transection 9/28/2020 with Dr. Thompson who presents with back pain after a fall. Patient reports that approximately 2 hours ago, she was skateboarding when she fell and landed on her buttocks, left elbow/shoulder and right hand. She did not hit her head. She currently has pain to her left shoulder and some swelling to her right hand, but her worst pain is near her coccyx. She did have nausea and 3 episodes of vomiting after the fall secondary to pain and took Tylenol for symptom relief prior to ED arrival. Patient notes that she is currently going to physical therapy twice a week to help her regain strength in her right leg after her surgery in Sept.     Review of Systems   Gastrointestinal: Positive for nausea and vomiting.   Musculoskeletal: Positive for back pain.        Left shoulder pain, right hand pain/swelling   All other systems reviewed and are negative.    Allergies:  No known drug allergies.     Medications:  Klonopin   Inderal  Lexapro  Gabapentin   Zanaflex     Past Medical History:    Anemia   Anxiety   Depression   PTSD    Varicella     Past Surgical History:    Right lumbar 5 medial branch nerve transection and right sacral 1/2/3 lateral branch nerve transection    Family History:    Mother: anxiety, depression  Father: depression   Brother: depression   Sister: depression     Social History:  Presents with a female    She is a skateboarding instructor     Physical Exam     Patient Vitals for the past 24 hrs:   BP Temp Temp src Pulse Resp SpO2 Height Weight   02/01/21 2124 97/60 96.5  F (35.8  C) Temporal 84 16 100 % 1.575 m (5' 2\") 79.4 kg (175 lb)     Physical Exam  General: Alert. Well kept.  HEENT:   Head: No facial asymmetry. No palpable " scalp hematomas or bony step offs. No frontal or maxillary facial tenderness.   Eyes: Normal conjunctiva. No scleral icterus. PERRLA. EOMI. No raccoon s eyes.   Nose: No deformity. No nasal drainage.   Throat: Moist mucous membranes. No evidence for intraoral trauma.   Neck: No midline tenderness over cervical spine or paraspinal musculature. Normal range of motion.   Cardiac: Normal rate and regular rhythm.  No murmurs, rubs, or gallops appreciated. 2+ radial pulses.   Pulmonary: CTA bilaterally. Normal breath sounds. No wheezing, crackles, or rhonchi appreciated.   Abdomen: Soft, non-tender, non-distended. No rebound or guarding.   Neuro: GCS 15. Alert and oriented. Cranial nerves II-XII intact. 5/5 strength equal bilateral upper and lower extremities. Gait smooth.  Visual fields bilateral without deficit.  MUSCULOSKELETAL: Normal gross range of motion of all 4 extremities. No midline tenderness over thoracic spine.  Tenderness to palpation over lumbar spine at L4-5 and sacral spine.   Upper extremities: 5/5 symmetric strength and ROM with shoulder abduction and adduction, elbow flexion and extension, dorsi- and palmar-flexion, , compartments soft.   Lower extremities: 5/5 symmetric strength and ROM with dorsi- and plantar-flexion, knee flexion and extension, hip flexion, hip internal and external rotation, compartments soft.   SKIN: Skin is warm and dry. No rashes, petechiae or pallor. Bruising over right palmar MCP index finger with normal distal sensation and motion.   PSYCH: Normal affect and mood. Good eye contact.    Emergency Department Course     Imaging:  XR Sacrum and Coccyx 2 Views  Normal joint spaces and alignment. No fracture.    Reading per radiology    Lumbar spine XR, 2-3 views  T11 and T12 vertebral bodies demonstrate mild loss of height, age-indeterminate. Remaining vertebral heights maintained. No significant subluxations. Mild multilevel spondylosis. Otherwise, no radiographic acute  fractures are identified. Bilateral sacroiliac joints are intact.  Reading per radiology     Emergency Department Course:  Reviewed:  I reviewed the patient's nursing notes, vitals, past medical records and Care Everywhere.     Assessments:  2136: I performed an exam of the patient as documented above.   2236: Rechecked and updated.      Disposition:  The patient was discharged to home.     Impression & Plan     Medical Decision Making:  Glenys Vuong is a 30 year old female who presents after fall with back pain.  Signs and symptoms are consistent with a contusion.  A broad differential was considered including sprain, strain, fracture, tendon rupture, nerve impingement/compromise, referred pain. The patient has not had saddle/perineal anesthesia, bilateral foot numbness, or bowel or bladder dysfunction. There is no clinical evidence of cauda equina syndrome or spinal/epidural space hematoma.  X-ray imaging negative for lumbar or sacral fracture.  There was noted T11/T12 vertebral body mild loss of height which is likely incidental as patient has no pain over the area.  Supportive outpatient management is indicated.  Rest and ice treatment was discussed with the patient and she will continue her Zanaflex and Lidoderm patches at home.  No indication for head CT using [place] head CT guidelines.  Her neck is cleared clinically using Nexus criteria.  No other injury noted on detailed trauma examination.  Close follow-up with patient's primary care physician per discharge precautions. No heavy lifting, bending or twisting. Return if increasing pain, numbness, weakness, or bowel or bladder dysfunction.    Diagnosis:    ICD-10-CM    1. Fall, initial encounter  W19.XXXA      Scribe Disclosure:  I, Brooklyn Broderick, am serving as a scribe at 9:41 PM on 2/1/2021 to document services personally performed by Angela Carrion DNP based on my observations and the provider's statements to me.          Angela Carrion,  CNP  02/01/21 2303

## 2021-04-03 ENCOUNTER — HOSPITAL ENCOUNTER (EMERGENCY)
Facility: CLINIC | Age: 31
Discharge: HOME | End: 2021-04-03
Attending: EMERGENCY MEDICINE | Admitting: EMERGENCY MEDICINE
Payer: COMMERCIAL

## 2021-04-03 VITALS
HEART RATE: 77 BPM | DIASTOLIC BLOOD PRESSURE: 44 MMHG | OXYGEN SATURATION: 98 % | SYSTOLIC BLOOD PRESSURE: 113 MMHG | WEIGHT: 175 LBS | TEMPERATURE: 98.1 F | RESPIRATION RATE: 16 BRPM | BODY MASS INDEX: 32.01 KG/M2

## 2021-04-03 DIAGNOSIS — L03.116 CELLULITIS OF LEFT LOWER EXTREMITY: ICD-10-CM

## 2021-04-03 PROCEDURE — 99282 EMERGENCY DEPT VISIT SF MDM: CPT

## 2021-04-03 RX ORDER — CLONAZEPAM 0.5 MG/1
0.5 TABLET ORAL
COMMUNITY
End: 2024-01-23

## 2021-04-03 RX ORDER — GABAPENTIN 300 MG/1
CAPSULE ORAL
COMMUNITY
Start: 2021-03-22 | End: 2024-01-23

## 2021-04-03 RX ORDER — ESCITALOPRAM OXALATE 10 MG/1
TABLET ORAL
COMMUNITY
Start: 2019-08-15 | End: 2024-01-23

## 2021-04-03 RX ORDER — SULFAMETHOXAZOLE/TRIMETHOPRIM 800-160 MG
1 TABLET ORAL 2 TIMES DAILY
Qty: 20 TABLET | Refills: 0 | Status: SHIPPED | OUTPATIENT
Start: 2021-04-03 | End: 2021-04-03

## 2021-04-03 RX ORDER — SULFAMETHOXAZOLE/TRIMETHOPRIM 800-160 MG
1 TABLET ORAL 2 TIMES DAILY
Qty: 20 TABLET | Refills: 0 | Status: SHIPPED | OUTPATIENT
Start: 2021-04-03 | End: 2022-08-12

## 2021-04-03 ASSESSMENT — ENCOUNTER SYMPTOMS
MYALGIAS: 1
WOUND: 1
COLOR CHANGE: 1

## 2021-04-03 NOTE — ED PROVIDER NOTES
History   Chief Complaint:  Leg pain       HPI   Glenys Vuong is a 30 year old female who presents with left leg pain. The patient says that she had an ingrown hair on the back of her left leg. She says that she was picking at it 2 days ago and pulled it. She says that since then she has noticed redness, swelling, and warmth to the area. She says that this morning it was difficult to walk due to the pain. The patient denies any prior infections like this.     Review of Systems   Musculoskeletal: Positive for myalgias.   Skin: Positive for color change and wound.   All other systems reviewed and are negative.    Allergies:  No Known Drug Allergies     Medications:  Zanaflex  Zoloft  Inderal  Gabapentin  Clonazepam  Klonopin  Lexapro     Past Medical History:    Anxiety  Depressive disorder  PTSD     Cannabis abuse  Anemia     Past Surgical History:    Endoscopic right L5, Medial, S1, S2, S3 lateral branch nerve transection     Family History:    Alcohol/drug  Cancer- lung, colon  Heart disease  Stroke  Psychiatric illness  Diabetes  Depression   Anxiety     Social History:  Patient presents to the ED with family    Physical Exam     Patient Vitals for the past 24 hrs:   BP Temp Temp src Pulse Resp SpO2 Weight   04/03/21 0709 113/44 98.1  F (36.7  C) Oral 77 16 98 % 79.4 kg (175 lb)       Physical Exam  General: Resting on the gurney, appears uncomfortable  Head:  The scalp, face, and head appear normal  Mouth/Throat: Mucus membranes are moist  CV:  Regular rate    Normal S1 and S2  No pathological murmur   Resp:  Breath sounds clear and equal bilaterally    Non-labored, no retractions or accessory muscle use    No coarseness    No wheezing   GI:  Abdomen is soft, no rigidity    No tenderness to palpation  MS:  LLE with erythema and tenderness to the posterior calf surrounding what appears to be a small opened abscess with minimal ongoing drainage.  Skin:  Se MS.  Neuro:  Speech is normal and fluent. No apparent  deficit.  Psych: Awake. Alert.  Normal affect.      Appropriate interactions.      Emergency Department Course     Procedures    Emergency Department Course:    Reviewed:  0714 I reviewed the patient's nursing notes, vitals, past medical records, Care Everywhere.        Assessments:  0726 I performed an exam of the patient as documented above.     Disposition:  The patient was discharged to home.       Impression & Plan       Medical Decision Making:  Glenys Vuong is a 30 year old female who presents for evaluation of skin redness.  The history, physical exam and supporting data are consistent with cellulitis.  There do not appear at this time to be any complication of cellulitis including necrotizing fascitis, lymphangitis, lymphadenitis, osteomyelitis, sepsis, or shock.  The patient is not immunosuppressed.  Supportive outpatient management is indicated with antibiotics.  Close follow-up of primary care physician to ensure no progression and rapid resolution.  Area of cellulitis was outlined.      Differential Diagnosis: Cellulitis, abscess, allergic reaction, bite/sting      Diagnosis:    ICD-10-CM    1. Cellulitis of left lower extremity  L03.116        Discharge Medications:  New Prescriptions    SULFAMETHOXAZOLE-TRIMETHOPRIM (BACTRIM DS) 800-160 MG TABLET    Take 1 tablet by mouth 2 times daily for 10 days       Scribe Disclosure:  Oh SOLIS, am serving as a scribe at 7:15 AM on 4/3/2021 to document services personally performed by Gemini Clements MD based on my observations and the provider's statements to me.          Gemini Clements MD  04/03/21 8447

## 2021-04-03 NOTE — ED TRIAGE NOTES
pt was picking at ingrown hair on back of left leg - started noticing redness, swelling and warmth to area two days ago.

## 2021-04-24 ENCOUNTER — HEALTH MAINTENANCE LETTER (OUTPATIENT)
Age: 31
End: 2021-04-24

## 2021-05-13 ENCOUNTER — HOSPITAL ENCOUNTER (EMERGENCY)
Facility: CLINIC | Age: 31
Discharge: HOME | End: 2021-05-13
Attending: NURSE PRACTITIONER | Admitting: NURSE PRACTITIONER
Payer: COMMERCIAL

## 2021-05-13 ENCOUNTER — APPOINTMENT (OUTPATIENT)
Dept: GENERAL RADIOLOGY | Facility: CLINIC | Age: 31
End: 2021-05-13
Attending: NURSE PRACTITIONER
Payer: COMMERCIAL

## 2021-05-13 VITALS
DIASTOLIC BLOOD PRESSURE: 70 MMHG | TEMPERATURE: 98.1 F | SYSTOLIC BLOOD PRESSURE: 116 MMHG | OXYGEN SATURATION: 100 % | WEIGHT: 175 LBS | HEIGHT: 63 IN | HEART RATE: 60 BPM | RESPIRATION RATE: 20 BRPM | BODY MASS INDEX: 31.01 KG/M2

## 2021-05-13 DIAGNOSIS — M25.469 JOINT EFFUSION OF THE LOWER LEG: ICD-10-CM

## 2021-05-13 DIAGNOSIS — S93.409A ANKLE SPRAIN: ICD-10-CM

## 2021-05-13 DIAGNOSIS — S93.402A SPRAIN OF LEFT ANKLE, UNSPECIFIED LIGAMENT, INITIAL ENCOUNTER: ICD-10-CM

## 2021-05-13 PROCEDURE — 250N000011 HC RX IP 250 OP 636: Performed by: NURSE PRACTITIONER

## 2021-05-13 PROCEDURE — 99283 EMERGENCY DEPT VISIT LOW MDM: CPT

## 2021-05-13 PROCEDURE — 73610 X-RAY EXAM OF ANKLE: CPT | Mod: LT

## 2021-05-13 PROCEDURE — 250N000013 HC RX MED GY IP 250 OP 250 PS 637: Performed by: NURSE PRACTITIONER

## 2021-05-13 RX ORDER — HYDROCODONE BITARTRATE AND ACETAMINOPHEN 5; 325 MG/1; MG/1
1 TABLET ORAL ONCE
Status: COMPLETED | OUTPATIENT
Start: 2021-05-13 | End: 2021-05-13

## 2021-05-13 RX ORDER — IBUPROFEN 600 MG/1
600 TABLET, FILM COATED ORAL ONCE
Status: COMPLETED | OUTPATIENT
Start: 2021-05-13 | End: 2021-05-13

## 2021-05-13 RX ORDER — ONDANSETRON 4 MG/1
4 TABLET, ORALLY DISINTEGRATING ORAL ONCE
Status: COMPLETED | OUTPATIENT
Start: 2021-05-13 | End: 2021-05-13

## 2021-05-13 RX ADMIN — HYDROCODONE BITARTRATE AND ACETAMINOPHEN 1 TABLET: 5; 325 TABLET ORAL at 21:50

## 2021-05-13 RX ADMIN — IBUPROFEN 600 MG: 600 TABLET, FILM COATED ORAL at 21:17

## 2021-05-13 RX ADMIN — ONDANSETRON 4 MG: 4 TABLET, ORALLY DISINTEGRATING ORAL at 21:50

## 2021-05-13 ASSESSMENT — ENCOUNTER SYMPTOMS: ARTHRALGIAS: 1

## 2021-05-13 ASSESSMENT — MIFFLIN-ST. JEOR: SCORE: 1487.92

## 2021-05-14 NOTE — ED PROVIDER NOTES
"    History     Chief Complaint:  Ankle Pain (Left)     HPI:   Glenys Vuong is a 30 year old female who presents with left ankle pain. Patient was skateboarding and landed with her left ankle inverted. Patient has radiating left ankle pain and swelling here. No left knee or hip pain. Denies hitting her head or loss of consciousness.  She has taken no medication for symptoms.  She denies additional injury.    Review of Systems   Musculoskeletal: Positive for arthralgias (left ankle).   All other systems reviewed and are negative.     Allergies:  No Known Allergies     Medications:    Klonopin  Clonazepam  Lexapro  Gabapentin  Propranolol  Zoloft  Tizanidine    Past Medical History:    Anxiety   PTSD  Depression  Cannabis abuse     Past Surgical History:    Lateral branch nerve resection     Family History:    Alcohol/drug abuse  Cancer  Heart disease  Stroke  diabetes  Depression  Anxiety     Social History:  Patient presents with friend.    Physical Exam     Vitals:  Patient Vitals for the past 24 hrs:   BP Temp Temp src Pulse Resp SpO2 Height Weight   05/13/21 2109 116/70 98.1  F (36.7  C) Temporal 60 20 100 % 1.6 m (5' 3\") 79.4 kg (175 lb)       Physical Exam:  Nursing notes reviewed. Vitals reviewed.  General: Alert. Moderate discomfort . Well kept.  Eyes: Conjunctiva non-injected, non-icteric.  Neck/Throat: Moist mucous membranes. Normal voice.  Cardiac: Regular rhythm. Normal heart sounds. 2+ DP pulses bilateral . Normal capillary refill.  Pulmonary: Clear and equal breath sounds bilaterally.   Musculoskeletal: No midline cervical, thoracic, lumbar spinal tenderness.  Left lower extremity: No foot deformity or swelling. No tenderness to palpation over dorsum of foot. Able to flex and extend toes. No obvious ankle deformity. Tenderness to palpation over lateral malleolus with significant swelling. No pain over the proximal tibia/fibula, medial malleolus, deltoid ligament, Lis-Franc joint or 5th metatarsal. " Full knee flexion and extension intact without difficulty. No knee tenderness to palpation.  No pain with range of motion or palpation of the hip.  Neurological: Alert and oriented x4. 5/5 strength bilateral lower extremity. Distal sensation intact.  Skin: No laceration or ecchymosis to affected extremity.   Psych: Affect normal. Good eye contact.    Emergency Department Course     Imaging:  XR Left ankle  The left ankle is negative for fracture. Ankle mortise is intact but there is significant soft tissue swelling over the lateral malleolus. There is a small tibiotalar joint effusion.  Per radiology.    Emergency Department Course:  Reviewed:  Past medical records, Care Everywhere, nursing notes, and vitals reviewed.     Assessments:  2128 I performed an exam of the patient and obtained history, as documented above.    Interventions:  2117 Ibuprofen, 600 mg, PO  2150 Norco, 1 tablet, PO  2150 Zofran, 4 mg, PO    Disposition:  The patient was discharged to home.     Impression & Plan   Medical Decision Making:  Glenys Vuong is a 30 year old female is a 30 year old year old female who presents to the emergency department with a recent onset of left ankle pain while skateboardig.  Differential diagnosis includes, but is not limited to ankle sprain, fracture, dislocation, syndesmotic injury, achilles tendon rupture, talar dome injury, midfoot injury (lisfranc injury), and talar injury.  XR was obtained, as above, without evidence of acute fracture or dislocation.  There is not tenderness to palpation over the mid-foot, nor 5th metatarsal to suggest lisfranc injury nor Chun fracture.  Lara testing is negative, and Achilles tendon is non-tender to palpation without appreciable defect to suggest tendinous rupture.  Additionally, there is no evidence of neurovascular compromise distally.    I discussed with the patient that at this point, they most likely have sustained an ankle sprain.  We reviewed that acute  swelling  after the injury sometimes limits accurate assessment of the extent of the injury, and for that reason, have recommended close follow-up with orthopedics.  We discussed appropriate treatment instructions include rest, ice, elevation, compression, and acetaminophen and/or ibuprofen for pain.  Patient will be given a walking boot and crutches for immobilization.  Patient was encouraged to perform simple range of motion exercises while at home, such as spelling out the alphabet with their toes and moving at the ankle.  No other injury noted on trauma examination.  No indication for admission.  They were encouraged to return to the ER with worsening pain, swelling, inability to bear weight, or any other new or troubling symptoms.    Diagnosis:    ICD-10-CM    1. Ankle sprain  S93.409A    2. Joint effusion of the lower leg  M25.469     tibiotalar joint effusion.        Discharge Medications:  New Prescriptions    No medications on file       Scribe Disclosure:  I, Radha Santiago, am serving as a scribe at 9:13 PM on 5/13/2021 to document services personally performed by Angela Carrion, DANIEL based on my observations and the provider's statements to me.       Radha Santiago  5/13/2021     Angela Carrion, CNP  05/13/21 6270

## 2021-06-03 VITALS
BODY MASS INDEX: 31 KG/M2 | WEIGHT: 175 LBS | BODY MASS INDEX: 31 KG/M2 | HEIGHT: 63 IN | WEIGHT: 175 LBS | BODY MASS INDEX: 33.66 KG/M2 | HEIGHT: 63 IN | BODY MASS INDEX: 33.66 KG/M2

## 2021-06-05 VITALS
BODY MASS INDEX: 29.23 KG/M2 | HEIGHT: 63 IN | WEIGHT: 165 LBS | HEIGHT: 63 IN | WEIGHT: 165 LBS | BODY MASS INDEX: 29.23 KG/M2

## 2021-06-11 NOTE — OP NOTE
Operative Note    Name:  Glenys Vuong   PCP:  Provider, No Primary Care   Procedure Date: 9/28/2020       Procedure(s): 1.  Right lumbar 5 medial branch nerve transection.  CPT code 40827  2.  Right sacral 1/2/3 lateral branch nerve transection.  CPT code 55159×3    Pre-Procedure Diagnosis:  Right sacroiliac joint pain right sacroiliitis, right sacroiliac joint syndrome    Post-Procedure Diagnosis:    SAME    Surgeon(s):  Ruben Thompson MD     No Physician Assistant or First Assist has been documented in procedure     Anesthesia Type:  MAC    Note Exparel was injected for postoperative pain control    Findings:  Identification of the right lumbar 5 medial branch nerve and the right sacral 1/2/3 lateral branch nerves which were removed with a pituitary rongeur and the ends were coagulated with bipolar electrocautery    Operative Report:    Patient was taken to the operating room placed in a prone position given a MAC anesthesia and preoperative antibiotics.  Her back was prepped and draped in normal sterile fashion.  Intraoperative fluoroscopy is utilized to identify the proper starting position for the right lumbar 5 medial branch nerve and the right sacral 1-3 lateral branch nerves 22-gauge 3 inch spinal needles were then advanced to each nerve location for a total of 4 needles.  I then injected 1% lidocaine with epinephrine and then subsequently made 4 separate incisions.  A soft tissue dilator was then advanced through the medial branch nerve location at the intersection of the sacral ala and the L5-S1 facet joint.  A tubular retractor was then placed and then subsequently under direct visualization I remove the soft tissue overlying the medial branch nerve and then resected the right L5 medial branch nerve with a pituitary rongeur.  The ends were coagulated with bipolar cautery.    I then made a separate incision for each sacral lateral branch nerve for a total of 4 incisions.  A soft tissue dilator was  advanced to the right sacral 1 foramina followed by placement of a tubular retractor under direct visualization I then identified the exiting sacral 1 lateral branch nerves and coagulated these and remove them with a pituitary rongeur.  After this I then performed the same procedure at the right sacral 2 foramina and subsequently the right sacral 3 foramina.    A total of 4 incisions were then closed with 3-0 Vicryl, Dermabond, and Steri-Strips.    And Exparel and Marcaine solution was then injected post procedurally for postoperative pain control.    Estimated Blood Loss:   12 cc    Specimens:    None       Drains:   None    Complications:    None    Ruben Thompson MD   [phone]    Date: 9/28/2020   Time: 11:42 AM

## 2021-06-11 NOTE — ANESTHESIA PREPROCEDURE EVALUATION
Anesthesia Evaluation      Patient summary reviewed   History of anesthetic complications (PONV; nausea after last back injection)     Airway   Mallampati: II  Neck ROM: full   Pulmonary - normal exam    breath sounds clear to auscultation  (-) not a smoker                         Cardiovascular - negative ROS  Exercise tolerance: > or = 4 METS  (-) murmur  Rhythm: regular  Rate: normal,    no murmur      Neuro/Psych    (+) depression, anxiety/panic attacks,     Endo/Other - negative ROS      GI/Hepatic/Renal - negative ROS           Dental - normal exam                        Anesthesia Plan  Planned anesthetic: MAC  9/25/20: 2019-nCOV  Not Detected      MAC w/ propofol gtt. Will discuss w/ surgeon if ok w/ ketamine / fentanyl PRN for pain.  Discussed potential need to convert to GA w/ ETT vs LMA if not tolerating.  Scopolamine patch, decadron and zofran for PONV ppx.  ASA 2     Anesthetic plan and risks discussed with: patient and spouse  Anesthesia plan special considerations: antiemetics,   Post-op plan: routine recovery

## 2021-06-11 NOTE — ANESTHESIA CARE TRANSFER NOTE
Last vitals:   Vitals:    09/28/20 1135   BP: 126/72   Pulse: 96   Resp: 16   Temp: 36.5  C (97.7  F)   SpO2: 97%     Patient's level of consciousness is drowsy  Spontaneous respirations: yes  Maintains airway independently: yes  Dentition unchanged: yes  Oropharynx: oropharynx clear of all foreign objects    QCDR Measures:  ASA# 20 - Surgical Safety Checklist: WHO surgical safety checklist completed prior to induction    PQRS# 430 - Adult PONV Prevention: 4558F - Pt received => 2 anti-emetic agents (different classes) preop & intraop  ASA# 8 - Peds PONV Prevention: NA - Not pediatric patient, not GA or 2 or more risk factors NOT present  PQRS# 424 - Gaby-op Temp Management: 4559F - At least one body temp DOCUMENTED => 35.5C or 95.9F within required timeframe  PQRS# 426 - PACU Transfer Protocol: - Transfer of care checklist used  ASA# 14 - Acute Post-op Pain: ASA14B - Patient did NOT experience pain >= 7 out of 10

## 2021-06-11 NOTE — ANESTHESIA POSTPROCEDURE EVALUATION
Patient: Glenys Vuong  Procedure(s):  ENDOSCOPIC RIGHT L5, MEDIAL, S1, S2, S3 LATERAL BRANCH NERVE TRANSECTION  Anesthesia type: MAC    Patient location: Phase II Recovery  Last vitals:   Vitals Value Taken Time   BP 97/53 9/28/2020 12:01 PM   Temp 36.5  C (97.7  F) 9/28/2020 11:35 AM   Pulse 60 9/28/2020 12:02 PM   Resp 16 9/28/2020 12:00 PM   SpO2 100 % 9/28/2020 12:02 PM   Vitals shown include unvalidated device data.  Post vital signs: stable  Level of consciousness: awake and responds to simple questions  Post-anesthesia pain: pain controlled  Post-anesthesia nausea and vomiting: no  Pulmonary: unassisted, return to baseline  Cardiovascular: stable and blood pressure at baseline  Hydration: adequate  Anesthetic events: no    QCDR Measures:  ASA# 11 - Gaby-op Cardiac Arrest: ASA11B - Patient did NOT experience unanticipated cardiac arrest  ASA# 12 - Gaby-op Mortality Rate: ASA12B - Patient did NOT die  ASA# 13 - PACU Re-Intubation Rate: ASA13B - Patient did NOT require a new airway mgmt  ASA# 10 - Composite Anes Safety: ASA10A - No serious adverse event    Additional Notes:

## 2021-10-09 ENCOUNTER — HEALTH MAINTENANCE LETTER (OUTPATIENT)
Age: 31
End: 2021-10-09
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
## 2022-05-16 ENCOUNTER — HEALTH MAINTENANCE LETTER (OUTPATIENT)
Age: 32
End: 2022-05-16

## 2022-08-12 ENCOUNTER — OFFICE VISIT (OUTPATIENT)
Dept: FAMILY MEDICINE | Facility: CLINIC | Age: 32
End: 2022-08-12
Payer: COMMERCIAL

## 2022-08-12 VITALS
BODY MASS INDEX: 28.64 KG/M2 | DIASTOLIC BLOOD PRESSURE: 68 MMHG | HEIGHT: 66 IN | HEART RATE: 71 BPM | SYSTOLIC BLOOD PRESSURE: 102 MMHG | OXYGEN SATURATION: 98 % | WEIGHT: 178.2 LBS | TEMPERATURE: 99.4 F

## 2022-08-12 DIAGNOSIS — F50.9 EATING DISORDER, UNSPECIFIED TYPE: ICD-10-CM

## 2022-08-12 DIAGNOSIS — Z76.89 ENCOUNTER TO ESTABLISH CARE: Primary | ICD-10-CM

## 2022-08-12 DIAGNOSIS — Z23 HIGH PRIORITY FOR 2019-NCOV VACCINE: ICD-10-CM

## 2022-08-12 DIAGNOSIS — Z23 ENCOUNTER FOR IMMUNIZATION: ICD-10-CM

## 2022-08-12 DIAGNOSIS — R00.2 PALPITATIONS: ICD-10-CM

## 2022-08-12 DIAGNOSIS — N92.0 MENORRHAGIA WITH REGULAR CYCLE: ICD-10-CM

## 2022-08-12 PROCEDURE — 99204 OFFICE O/P NEW MOD 45 MIN: CPT | Mod: 25 | Performed by: STUDENT IN AN ORGANIZED HEALTH CARE EDUCATION/TRAINING PROGRAM

## 2022-08-12 PROCEDURE — 91305 COVID-19,PF,PFIZER (12+ YRS): CPT | Performed by: STUDENT IN AN ORGANIZED HEALTH CARE EDUCATION/TRAINING PROGRAM

## 2022-08-12 PROCEDURE — 0054A COVID-19,PF,PFIZER (12+ YRS): CPT | Performed by: STUDENT IN AN ORGANIZED HEALTH CARE EDUCATION/TRAINING PROGRAM

## 2022-08-12 PROCEDURE — 93000 ELECTROCARDIOGRAM COMPLETE: CPT | Performed by: STUDENT IN AN ORGANIZED HEALTH CARE EDUCATION/TRAINING PROGRAM

## 2022-08-12 ASSESSMENT — ANXIETY QUESTIONNAIRES
GAD7 TOTAL SCORE: 21
6. BECOMING EASILY ANNOYED OR IRRITABLE: NEARLY EVERY DAY
GAD7 TOTAL SCORE: 21
1. FEELING NERVOUS, ANXIOUS, OR ON EDGE: NEARLY EVERY DAY
2. NOT BEING ABLE TO STOP OR CONTROL WORRYING: NEARLY EVERY DAY
5. BEING SO RESTLESS THAT IT IS HARD TO SIT STILL: NEARLY EVERY DAY
7. FEELING AFRAID AS IF SOMETHING AWFUL MIGHT HAPPEN: NEARLY EVERY DAY
IF YOU CHECKED OFF ANY PROBLEMS ON THIS QUESTIONNAIRE, HOW DIFFICULT HAVE THESE PROBLEMS MADE IT FOR YOU TO DO YOUR WORK, TAKE CARE OF THINGS AT HOME, OR GET ALONG WITH OTHER PEOPLE: EXTREMELY DIFFICULT
3. WORRYING TOO MUCH ABOUT DIFFERENT THINGS: NEARLY EVERY DAY

## 2022-08-12 ASSESSMENT — PATIENT HEALTH QUESTIONNAIRE - PHQ9
SUM OF ALL RESPONSES TO PHQ QUESTIONS 1-9: 27
5. POOR APPETITE OR OVEREATING: NEARLY EVERY DAY

## 2022-08-12 NOTE — PROGRESS NOTES
Assessment & Plan     Encounter to establish care  Patient presenting to establish care, will get a Pap smear at a future visit.  Does not think they have had an HIV or hepatitis C test, no history of injection drug use.  Does not smoke.  Mostly gets their care in [facility] as they prioritize their mental health, is looking to establish a place to look into their physical health.  All questions were answered, no medications need refilling, patient does not feel the need any referral.  - Comprehensive metabolic panel; Future  - Lipid panel reflex to direct LDL Non-fasting; Future  - HIV Antigen Antibody Combo; Future  - Hepatitis C Screen Reflex to HCV RNA Quant and Genotype; Future    Palpitations  Patient palpitations, abnormal EKG reported from an outside facility.  EKG today shows sinus bradycardia mild that is consistent with patient being on propranolol 3 times daily.  No orthostatic symptoms.  At this time we did discuss that if patient were to have repeated palpitations or symptoms would consider getting a Zio patch, especially considering that there family history is not completely clear.  I did not see on the EKG an indication that would prevent them from starting a stimulant medication however did discuss that they will have to be mindful of the symptoms in their own body to determine if they can tolerate it.  Given patient's intermittent restrictive eating pattern also ordered magnesium and phosphorus to be sure that there is not a refeeding component.  - EKG 12-lead complete w/read - Clinics  - Phosphorus; Future  - Magnesium; Future  - Lipid panel reflex to direct LDL Non-fasting; Future    Eating disorder, unspecified type  Patient with disordered eating related to their mental health.  Has days where they cannot eat anything despite having an appetite and wanting to eat, this is something they are working on with her mental health care.  She and her wife have strategies for trying to set it up to  "be \"good few days \".  Over the last month has had more days where she does not eat then days where she does eat.  Does not seem to be any symptomatic correlation with her palpitations.  No labs drawn at [facility].  We will rule out refeeding syndrome as a possible contributor or electrolyte dysregulation as a contributor to what ever EKG changes were found.  Care and to be sure that there is not some supplementation that needs to be happening.  Given patient's family history of coronary artery disease and intermittent fasting pattern of eating which can contribute to worsening metabolic syndromes, will also get lipid panel.  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  She has had her to receive the vaccine, this was given and received today.  - COVID-19,PF,PFIZER (12+ Yrs GRAY LABEL)    Follow-up with this provider when next available to complete Pap smear and pelvic exam    No follow-ups on file.    Chela Holguin MD  [facility] AMANDA Stahl is a 31 year old accompanied by her wife, presenting for the following health issues:  establish new care ( Pt is here to establish new care and review EKG results from [facility]. )      HPI     Patient is established at [facility].  She is seen for PTSD, depression anxiety, her medications are managed there.  There is some discussion of her starting a stimulant for possible ADHD, when doing the preparatory EKG there was reportedly some thing atypical found in the EKG for which she needed in person evaluation at a primary care office which is why she is here.  While she does have high anxiety and depression she feels " "that this is stable.  She does have a safety plan.  Her father committed suicide when she was a teenager and was found to have significant coronary artery disease on his autopsy, she is therefore very mindful that she has a family history of cardiac disease.    1 manifestation of her mental health is difficulty eating, finds that she has days that she cannot eat anything, its not that she wants to avoid the food she is interested in eating just feels that she cannot force herself to and this is deeply frustrating for her.  She is happy with the way her body looks and operates right now, she is able to do skateboarding and do her job as a grocery.  Sometimes her depression manifests as needing to sleep for 18 hours however this is part of her rhythm.  No specific foods or food categories that she avoids.    Is due for Pap smear and pelvic exam, last time this was difficult because there were too many people in the room she had multiple students and observers and felt this was very uncomfortable.  Discussed the way we do pelvic exams.    Review of Systems   See HPI      Objective    /68   Pulse 71   Temp 99.4  F (37.4  C) (Oral)   Ht 1.666 m (5' 5.59\")   Wt 80.8 kg (178 lb 3.2 oz)   SpO2 98%   BMI 29.12 kg/m    Body mass index is 29.12 kg/m .  Physical Exam   General alert and appropriate human who appears stated age well-groomed  Mucous membranes are moist and pink  Speaking in full sentences on room air  Regular rate and rhythm on initial exam, EKG with bradycardia to 45  Abdomen nondistended  Ambulates independently with appropriate gait, moving all extremities symmetrically  Affect appropriate to conversation and situation    EKG - Reviewed and interpreted by me sinus bradycardia (on propranolol), normal axis, normal intervals, no acute ST/T changes c/w ischemia, no LVH by voltage criteria, nonspecific ST-T changes, there are no prior tracings available  No results found for this or any previous visit " (from the past 24 hour(s)).        .  ..

## 2022-08-12 NOTE — PATIENT INSTRUCTIONS
Thank you for coming in today,    It was cheri to meet you both.  I will see you when next available so that we can do the physical exam and get a Pap smear done which you are due for.    Your EKG is reassuring to me right now, it is consistent with you being on a beta-blocker which we already knew.  I sent a copy with you for your [place] care.  I will see anything that would prevent you from getting stimulants to try however if you become more symptomatic please do let me know.  At that time we would consider a Zio patch, or the patch you are in your chest that watches your heart rate and rhythm for 14 days.  We will check your labs to make sure that there is not an electrolyte or other metabolic causes that could be contributing as well.    Glad to have the mental health care you need and that you feel like you are supported.  Please let me know if there is other help or support that I could be providing.  I will see you when next I see you for your pelvic exam and general physical exam.    It was cheri to meet you.  Chela Holguin MD

## 2022-08-12 NOTE — PROGRESS NOTES
Preceptor Attestation:   I personally viewed the EKG and agree with the interpretation documented by the resident. I have verified the content of the note, which accurately reflects my assessment of the patient and the plan of care.   Supervising Physician:  Sharad Lawson MD.

## 2022-09-11 ENCOUNTER — HEALTH MAINTENANCE LETTER (OUTPATIENT)
Age: 32
End: 2022-09-11

## 2023-06-03 ENCOUNTER — HEALTH MAINTENANCE LETTER (OUTPATIENT)
Age: 33
End: 2023-06-03

## 2024-01-20 ENCOUNTER — HOSPITAL ENCOUNTER (EMERGENCY)
Facility: CLINIC | Age: 34
Discharge: HOME | End: 2024-01-20
Attending: EMERGENCY MEDICINE | Admitting: EMERGENCY MEDICINE
Payer: COMMERCIAL

## 2024-01-20 VITALS
SYSTOLIC BLOOD PRESSURE: 113 MMHG | OXYGEN SATURATION: 98 % | RESPIRATION RATE: 16 BRPM | DIASTOLIC BLOOD PRESSURE: 45 MMHG | HEART RATE: 70 BPM | TEMPERATURE: 98.8 F

## 2024-01-20 DIAGNOSIS — F41.0 PANIC REACTION: ICD-10-CM

## 2024-01-20 DIAGNOSIS — F41.8 DEPRESSION WITH ANXIETY: ICD-10-CM

## 2024-01-20 PROBLEM — F33.1 MAJOR DEPRESSIVE DISORDER, RECURRENT EPISODE, MODERATE (H): Status: ACTIVE | Noted: 2024-01-20

## 2024-01-20 LAB
ALBUMIN SERPL BCG-MCNC: 4.2 G/DL (ref 3.5–5.2)
ALP SERPL-CCNC: 44 U/L (ref 40–150)
ALT SERPL W P-5'-P-CCNC: 11 U/L (ref 0–50)
ANION GAP SERPL CALCULATED.3IONS-SCNC: 8 MMOL/L (ref 7–15)
AST SERPL W P-5'-P-CCNC: 20 U/L (ref 0–45)
ATRIAL RATE - MUSE: 63 BPM
BASOPHILS # BLD AUTO: 0 10E3/UL (ref 0–0.2)
BASOPHILS NFR BLD AUTO: 1 %
BILIRUB SERPL-MCNC: 0.4 MG/DL
BUN SERPL-MCNC: 10 MG/DL (ref 6–20)
CALCIUM SERPL-MCNC: 8.9 MG/DL (ref 8.6–10)
CHLORIDE SERPL-SCNC: 104 MMOL/L (ref 98–107)
CREAT SERPL-MCNC: 0.9 MG/DL (ref 0.51–0.95)
DEPRECATED HCO3 PLAS-SCNC: 27 MMOL/L (ref 22–29)
DIASTOLIC BLOOD PRESSURE - MUSE: NORMAL MMHG
EGFRCR SERPLBLD CKD-EPI 2021: 87 ML/MIN/1.73M2
EOSINOPHIL # BLD AUTO: 0.2 10E3/UL (ref 0–0.7)
EOSINOPHIL NFR BLD AUTO: 2 %
ERYTHROCYTE [DISTWIDTH] IN BLOOD BY AUTOMATED COUNT: 14.5 % (ref 10–15)
GLUCOSE SERPL-MCNC: 89 MG/DL (ref 70–99)
HCG SERPL QL: NEGATIVE
HCT VFR BLD AUTO: 36.8 % (ref 35–47)
HGB BLD-MCNC: 12 G/DL (ref 11.7–15.7)
IMM GRANULOCYTES # BLD: 0 10E3/UL
IMM GRANULOCYTES NFR BLD: 0 %
INTERPRETATION ECG - MUSE: NORMAL
LYMPHOCYTES # BLD AUTO: 1.6 10E3/UL (ref 0.8–5.3)
LYMPHOCYTES NFR BLD AUTO: 21 %
MCH RBC QN AUTO: 27.8 PG (ref 26.5–33)
MCHC RBC AUTO-ENTMCNC: 32.6 G/DL (ref 31.5–36.5)
MCV RBC AUTO: 85 FL (ref 78–100)
MONOCYTES # BLD AUTO: 0.5 10E3/UL (ref 0–1.3)
MONOCYTES NFR BLD AUTO: 7 %
NEUTROPHILS # BLD AUTO: 5.2 10E3/UL (ref 1.6–8.3)
NEUTROPHILS NFR BLD AUTO: 69 %
NRBC # BLD AUTO: 0 10E3/UL
NRBC BLD AUTO-RTO: 0 /100
P AXIS - MUSE: 62 DEGREES
PLATELET # BLD AUTO: 174 10E3/UL (ref 150–450)
POTASSIUM SERPL-SCNC: 4.1 MMOL/L (ref 3.4–5.3)
PR INTERVAL - MUSE: 154 MS
PROT SERPL-MCNC: 6.5 G/DL (ref 6.4–8.3)
QRS DURATION - MUSE: 92 MS
QT - MUSE: 460 MS
QTC - MUSE: 470 MS
R AXIS - MUSE: 80 DEGREES
RBC # BLD AUTO: 4.32 10E6/UL (ref 3.8–5.2)
SODIUM SERPL-SCNC: 139 MMOL/L (ref 135–145)
SYSTOLIC BLOOD PRESSURE - MUSE: NORMAL MMHG
T AXIS - MUSE: 26 DEGREES
TROPONIN T SERPL HS-MCNC: <6 NG/L
VENTRICULAR RATE- MUSE: 63 BPM
WBC # BLD AUTO: 7.6 10E3/UL (ref 4–11)

## 2024-01-20 PROCEDURE — 250N000011 HC RX IP 250 OP 636: Performed by: EMERGENCY MEDICINE

## 2024-01-20 PROCEDURE — 250N000013 HC RX MED GY IP 250 OP 250 PS 637: Performed by: EMERGENCY MEDICINE

## 2024-01-20 PROCEDURE — 36415 COLL VENOUS BLD VENIPUNCTURE: CPT | Performed by: EMERGENCY MEDICINE

## 2024-01-20 PROCEDURE — 84703 CHORIONIC GONADOTROPIN ASSAY: CPT | Performed by: EMERGENCY MEDICINE

## 2024-01-20 PROCEDURE — 80053 COMPREHEN METABOLIC PANEL: CPT | Performed by: EMERGENCY MEDICINE

## 2024-01-20 PROCEDURE — 85025 COMPLETE CBC W/AUTO DIFF WBC: CPT | Performed by: EMERGENCY MEDICINE

## 2024-01-20 PROCEDURE — 99284 EMERGENCY DEPT VISIT MOD MDM: CPT

## 2024-01-20 PROCEDURE — 84484 ASSAY OF TROPONIN QUANT: CPT | Performed by: EMERGENCY MEDICINE

## 2024-01-20 PROCEDURE — 93005 ELECTROCARDIOGRAM TRACING: CPT | Mod: RTG

## 2024-01-20 RX ORDER — ONDANSETRON 4 MG/1
4 TABLET, ORALLY DISINTEGRATING ORAL ONCE
Status: COMPLETED | OUTPATIENT
Start: 2024-01-20 | End: 2024-01-20

## 2024-01-20 RX ORDER — DIAZEPAM 5 MG
5 TABLET ORAL ONCE
Status: COMPLETED | OUTPATIENT
Start: 2024-01-20 | End: 2024-01-20

## 2024-01-20 RX ADMIN — DIAZEPAM 5 MG: 5 TABLET ORAL at 19:36

## 2024-01-20 RX ADMIN — ONDANSETRON 4 MG: 4 TABLET, ORALLY DISINTEGRATING ORAL at 19:36

## 2024-01-20 ASSESSMENT — ACTIVITIES OF DAILY LIVING (ADL)
ADLS_ACUITY_SCORE: 35
ADLS_ACUITY_SCORE: 35

## 2024-01-21 ENCOUNTER — HOSPITAL ENCOUNTER (OUTPATIENT)
Facility: CLINIC | Age: 34
Setting detail: OBSERVATION
Discharge: HOME | End: 2024-01-23
Attending: EMERGENCY MEDICINE | Admitting: NURSE PRACTITIONER
Payer: COMMERCIAL

## 2024-01-21 DIAGNOSIS — F41.1 GENERALIZED ANXIETY DISORDER WITH PANIC ATTACKS: ICD-10-CM

## 2024-01-21 DIAGNOSIS — F33.1 MODERATE EPISODE OF RECURRENT MAJOR DEPRESSIVE DISORDER (H): ICD-10-CM

## 2024-01-21 DIAGNOSIS — F41.0 GENERALIZED ANXIETY DISORDER WITH PANIC ATTACKS: ICD-10-CM

## 2024-01-21 DIAGNOSIS — F43.10 PTSD (POST-TRAUMATIC STRESS DISORDER): ICD-10-CM

## 2024-01-21 PROCEDURE — 99223 1ST HOSP IP/OBS HIGH 75: CPT | Mod: AI | Performed by: NURSE PRACTITIONER

## 2024-01-21 PROCEDURE — 250N000013 HC RX MED GY IP 250 OP 250 PS 637: Performed by: NURSE PRACTITIONER

## 2024-01-21 PROCEDURE — G0378 HOSPITAL OBSERVATION PER HR: HCPCS

## 2024-01-21 PROCEDURE — 250N000013 HC RX MED GY IP 250 OP 250 PS 637: Performed by: EMERGENCY MEDICINE

## 2024-01-21 PROCEDURE — 99285 EMERGENCY DEPT VISIT HI MDM: CPT

## 2024-01-21 RX ORDER — LANOLIN ALCOHOL/MO/W.PET/CERES
3 CREAM (GRAM) TOPICAL
Status: DISCONTINUED | OUTPATIENT
Start: 2024-01-21 | End: 2024-01-23 | Stop reason: HOSPADM

## 2024-01-21 RX ORDER — ONDANSETRON 4 MG/1
4 TABLET, ORALLY DISINTEGRATING ORAL EVERY 6 HOURS PRN
Status: DISCONTINUED | OUTPATIENT
Start: 2024-01-21 | End: 2024-01-23 | Stop reason: HOSPADM

## 2024-01-21 RX ORDER — HYDROXYZINE HYDROCHLORIDE 50 MG/1
50 TABLET, FILM COATED ORAL EVERY 6 HOURS PRN
Status: DISCONTINUED | OUTPATIENT
Start: 2024-01-21 | End: 2024-01-23 | Stop reason: HOSPADM

## 2024-01-21 RX ORDER — ESCITALOPRAM OXALATE 20 MG/1
20 TABLET ORAL DAILY
Status: DISCONTINUED | OUTPATIENT
Start: 2024-01-21 | End: 2024-01-23 | Stop reason: HOSPADM

## 2024-01-21 RX ORDER — IBUPROFEN 600 MG/1
600 TABLET, FILM COATED ORAL EVERY 6 HOURS PRN
Status: DISCONTINUED | OUTPATIENT
Start: 2024-01-21 | End: 2024-01-23 | Stop reason: HOSPADM

## 2024-01-21 RX ORDER — ACETAMINOPHEN 325 MG/1
650 TABLET ORAL EVERY 4 HOURS PRN
Status: DISCONTINUED | OUTPATIENT
Start: 2024-01-21 | End: 2024-01-23 | Stop reason: HOSPADM

## 2024-01-21 RX ORDER — OLANZAPINE 5 MG/1
5-10 TABLET, ORALLY DISINTEGRATING ORAL 2 TIMES DAILY PRN
Status: DISCONTINUED | OUTPATIENT
Start: 2024-01-21 | End: 2024-01-23 | Stop reason: HOSPADM

## 2024-01-21 RX ORDER — TRAZODONE HYDROCHLORIDE 50 MG/1
50 TABLET, FILM COATED ORAL
Status: DISCONTINUED | OUTPATIENT
Start: 2024-01-21 | End: 2024-01-23 | Stop reason: HOSPADM

## 2024-01-21 RX ORDER — GABAPENTIN 100 MG/1
200 CAPSULE ORAL 2 TIMES DAILY
Status: DISCONTINUED | OUTPATIENT
Start: 2024-01-22 | End: 2024-01-22

## 2024-01-21 RX ORDER — GABAPENTIN 300 MG/1
300 CAPSULE ORAL AT BEDTIME
Status: DISCONTINUED | OUTPATIENT
Start: 2024-01-21 | End: 2024-01-22

## 2024-01-21 RX ORDER — PROPRANOLOL HYDROCHLORIDE 10 MG/1
10 TABLET ORAL 3 TIMES DAILY PRN
Status: DISCONTINUED | OUTPATIENT
Start: 2024-01-21 | End: 2024-01-23 | Stop reason: HOSPADM

## 2024-01-21 RX ORDER — OLANZAPINE 10 MG/2ML
10 INJECTION, POWDER, FOR SOLUTION INTRAMUSCULAR 2 TIMES DAILY PRN
Status: DISCONTINUED | OUTPATIENT
Start: 2024-01-21 | End: 2024-01-23 | Stop reason: HOSPADM

## 2024-01-21 RX ORDER — LORAZEPAM 1 MG/1
1 TABLET ORAL ONCE
Status: COMPLETED | OUTPATIENT
Start: 2024-01-21 | End: 2024-01-21

## 2024-01-21 RX ADMIN — OLANZAPINE 10 MG: 5 TABLET, ORALLY DISINTEGRATING ORAL at 18:36

## 2024-01-21 RX ADMIN — ESCITALOPRAM OXALATE 20 MG: 20 TABLET, FILM COATED ORAL at 18:36

## 2024-01-21 RX ADMIN — GABAPENTIN 300 MG: 300 CAPSULE ORAL at 21:43

## 2024-01-21 RX ADMIN — LORAZEPAM 1 MG: 1 TABLET ORAL at 12:56

## 2024-01-21 ASSESSMENT — ACTIVITIES OF DAILY LIVING (ADL)
ADLS_ACUITY_SCORE: 35
ADLS_ACUITY_SCORE: 35
ADLS_ACUITY_SCORE: 33
ADLS_ACUITY_SCORE: 35

## 2024-01-21 NOTE — ED TRIAGE NOTES
"Pt has been having panic attacks starting today, hx anxiety and depression, pt states \"I have suicidal ideation everyday but no different than any other day and no current plan\"        "

## 2024-01-21 NOTE — ED NOTES
Report to oncoming Rn at change of shift - I did not get to be with pt prior to change but pt vitaled and seen by Provider .

## 2024-01-21 NOTE — DISCHARGE INSTRUCTIONS
Discharge Instructions  Mental Health Concerns    You were seen today for mental health concerns, such as depression, anxiety, or suicidal thinking. Your provider feels that you do not require hospitalization at this time. However, your symptoms may become worse, and you may need to return to the Emergency Department. Most treatments of depression and suicidal thoughts are a process rather than a single intervention.  Medications and counseling can take several weeks or more to help.    Generally, every Emergency Department visit should have a follow-up clinic visit with either a primary or a specialty clinic/provider. Please follow-up as instructed by your emergency provider today.    By accepting these discharge instructions:  You promise to not harm yourself or others.  You agree that if you feel you are becoming unable to keep that promise, you will do something to help yourself before you do anything to harm yourself or others.   You agree to keep any safety plan arranged on your visit here today.  You agree to take any medication prescribed or recommended by your provider.  If you are getting worse, you can contact a friend or a family member, contact your counselor or family provider, contact a crisis line, or other options discussed with the provider or therapist today.  At any time, you can call 911 and return to the Emergency Department for more help.  You understand that follow-up is essential to your treatment, and you will make and keep appointments recommended on your visit today.    How to improve your mental health and prevent suicide:  Involve others by letting family, friends, counselors know.  Do not isolate yourself.  Avoid alcohol or drugs. Remove weapons, poisons from your home.  Try to stick to routines for eating, sleeping and getting regular exercise.    Try to get into sunlight. Bright natural light not only treats seasonal affective disorder but also depression.  Increase safe activities  that you enjoy.    If you feel worse, contact 1-800-suicide (1-648.787.9413), or call 911, or your primary provider/counselor for additional assistance.    If you were given a prescription for medicine here today, be sure to read all of the information (including the package insert) that comes with your prescription.  This will include important information about the medicine, its side effects, and any warnings that you need to know about.  The pharmacist who fills the prescription can provide more information and answer questions you may have about the medicine.  If you have questions or concerns that the pharmacist cannot address, please call or return to the Emergency Department.   Remember that you can always come back to the Emergency Department if you are not able to see your regular provider in the amount of time listed above, if you get any new symptoms, or if there is anything that worries you.

## 2024-01-21 NOTE — CONSULTS
Diagnostic Evaluation Consultation  Crisis Assessment    Patient Name: Glenys Vuong  Age:  33 year old  Legal Sex: female  Gender Identity: female  Pronouns:   Race: White  Ethnicity: Not  or   Language: English      Patient was assessed: In person      Patient location: [facility] EMERGENCY DEPT                                 Referral Data and Chief Complaint  Glenys Vuong presents to the ED by  self. Patient is presenting to the ED for the following concerns: Suicidal ideation, Worsening psychosocial stress, Depression, Anxiety.   Factors that make the mental health crisis life threatening or complex are:  Pt's partner of 8 years broke up with pt this morning. Pt reported that it was unexpected. She came to the ED as she was concerned about her heart after having panic attacks all day. She has been told that her father who  by suicide when she was a teenager was weeks away from having a heart attack. Because of this, pt frequently feels worried about the condition heart, particularly in the context of high anxiety. Pt reported that at baseline she has chronic SI, it is the first think she thinks about when she wakes up in the morning. She denies plan and intent as well as increased SI today..      Informed Consent and Assessment Methods  Explained the crisis assessment process, including applicable information disclosures and limits to confidentiality, assessed understanding of the process, and obtained consent to proceed with the assessment.  Assessment methods included conducting a formal interview with patient, review of medical records, collaboration with medical staff, and obtaining relevant collateral information from family and community providers when available.  : done     Patient response to interventions: acceptance expressed, verbalizes understanding  Coping skills were attempted to reduce the crisis:  Pt reported that she doesn't use any coping strategies. She  came to the ED today.     History of the Crisis   Pt has extensive history of trauma, including her father's suicide when she was 17. She reported that she also lost 8 other family members the same year her dad . Pt has been doing ongoing therapy for years. She has a current therapist and psychiatry provider that she trusts. Pt denies MH IP as well as other higher level of care mental health treatment. Pt reported that her mental health has been getting in the way of her relationship that just ended today.    Brief Psychosocial History  Family:  Single, Children no  Support System:  Partner, Parent(s)  Employment Status:  employed full-time  Source of Income:  salary/wages  Financial Environmental Concerns:  none  Current Hobbies:  other (see comments) (skateboarding)  Barriers in Personal Life:  mental health concerns    Significant Clinical History  Current Anxiety Symptoms:  anxious, excessive worry  Current Depression/Trauma:  apathy, avoidance, withdrawl/isolation, crying or feels like crying, low self esteem, helplessness, hopelessness, sadness, thoughts of death/suicide  Current Somatic Symptoms:  anxious  Current Psychosis/Thought Disturbance:     Current Eating Symptoms:  loss of appetite  Chemical Use History:  Alcohol: Social  Benzodiazepines: None  Opiates: None  Cocaine: None  Marijuana: Daily  Other Use: None   Past diagnosis:  Anxiety Disorder, Depression, PTSD, Suicide attempt(s)  Family history:  Depression, Death by Suicide (Pt's father  by suicide when pt was a teenager.)  Past treatment:  Individual therapy, Psychiatric Medication Management, Inpatient Hospitalization  Details of most recent treatment:  Pt has a therapist at [facility] (Merle Almaraz) and medication provider at [facility] (Octavio Boone).  Other relevant history:  Pt denies past IP.       Collateral Information  Collateral information was not obtained as part of today's assessment.      Risk  Assessment  Rooks Suicide Severity Rating Scale Full Clinical Version:  Suicidal Ideation  Q1 Wish to be Dead (Lifetime): Yes  Q2 Non-Specific Active Suicidal Thoughts (Lifetime): Yes  3. Active Suicidal Ideation with any Methods (Not Plan) Without Intent to Act (Lifetime): Yes  Q4 Active Suicidal Ideation with Some Intent to Act, Without Specific Plan (Lifetime): Yes  Q5 Active Suicidal Ideation with Specific Plan and Intent (Lifetime): Yes  Q6 Suicide Behavior (Lifetime): yes     Suicidal Behavior (Lifetime)  Actual Attempt (Lifetime): Yes  Total Number of Actual Attempts (Lifetime): 2  Actual Attempt Description (Lifetime): Pt reported that she took tyleol 1x in high school and cut her wrists 1x in high school.  Has subject engaged in non-suicidal self-injurious behavior? (Lifetime): Yes  Interrupted Attempts (Lifetime): No  Aborted or Self-Interrupted Attempt (Lifetime): No  Preparatory Acts or Behavior (Lifetime): No    Rooks Suicide Severity Rating Scale Recent:   Suicidal Ideation (Recent)  Q1 Wished to be Dead (Past Month): yes  Q2 Suicidal Thoughts (Past Month): yes  Q3 Suicidal Thought Method: yes  Q4 Suicidal Intent without Specific Plan: yes  Q5 Suicide Intent with Specific Plan: no  Level of Risk per Screen: high risk  Intensity of Ideation (Recent)  Most Severe Ideation Rating (Past 1 Month): 3  Frequency (Past 1 Month): Many times each day  Duration (Past 1 Month): Less than 1 hour/some of the time  Controllability (Past 1 Month): Can control thoughts with some difficulty  Deterrents (Past 1 Month): Deterrents definitely stopped you from attempting suicide  Reasons for Ideation (Past 1 Month): Mostly to end or stop the pain (You couldn't go on living with the pain or how you were feeling)  Suicidal Behavior (Recent)  Actual Attempt (Past 3 Months): No  Has subject engaged in non-suicidal self-injurious behavior? (Past 3 Months): No  Interrupted Attempts (Past 3 Months): No  Aborted or  Self-Interrupted Attempt (Past 3 Months): No  Preparatory Acts or Behavior (Past 3 Months): No    Environmental or Psychosocial Events: recent life events (see comment) (Partner (demetris) of 8 years broke up today,)  Protective Factors: Protective Factors: responsibilities and duties to others, including pets and children, good treatment engagement, sense of importance of health and wellness, able to access care without barriers, help seeking, good impulse control, reality testing ability    Does the patient have thoughts of harming others? Feels Like Hurting Others: no  Previous Attempt to Hurt Others: no    Is the patient engaging in sexually inappropriate behavior? No           Mental Status Exam   Affect: Appropriate  Appearance: Appropriate  Attention Span/Concentration: Attentive  Eye Contact: Avoidant    Fund of Knowledge: Appropriate   Language /Speech Content: Fluent  Language /Speech Volume: Normal  Language /Speech Rate/Productions: Normal  Recent Memory: Intact  Remote Memory: Intact  Mood: Depressed, Sad  Orientation to Person: Yes   Orientation to Place: Yes  Orientation to Time of Day: Yes  Orientation to Date: Yes     Situation (Do they understand why they are here?): Yes  Psychomotor Behavior: Normal  Thought Content: Clear  Thought Form: Goal Directed, Intact     Mini-Cog Assessment  Number of Words Recalled:    Clock-Drawing Test:     Three Item Recall:    Mini-Cog Total Score:       Medication  Psychotropic medications:   Medication Orders - Psychiatric (From admission, onward)      None             Current Care Team  Patient Care Team:  Chela Orr MD as PCP - General (Student in organized health care education/training program)    Diagnosis  Patient Active Problem List   Diagnosis Code    Fall W19.XXXA    Major depressive disorder, recurrent episode, moderate (H) F33.1       Primary Problem This Admission  Active Hospital Problems    Major depressive disorder, recurrent episode,  "moderate (H)        Clinical Summary and Substantiation of Recommendations   Pt's partner of 8 years broke up with pt this morning. Pt reported that it was unexpected. She came to the ED as she was concerned about her heart after having panic attacks all day. She has been told that her father who  by suicide when she was a teenager was weeks away from having a heart attack. Because of this, pt frequently feels worried about the condition heart, particularly in the context of high anxiety. Pt reported that at baseline she has chronic SI, it is the first think she thinks about when she wakes up in the morning. She has thoughts such as \"I wish I wasn't here anymore\".  She denies plan and intent as well as increased SI today.     After therapeutic assessment, intervention and aftercare planning by ED care team and [facility] and in consultation with attending provider, the patient's circumstances and mental state were safe for outpatient management. The patient was discharged. Close follow-up with a psychiatrist and/or therapist was recommended and community psychiatric resources were provided. Patient is to return to the ED if any urgent or potentially life-threatening concerns arise.          At the time of discharge, the patient's acute suicide risk was determined to be low due to the following factors: reduction in the intensity of mood/anxiety symptoms that preceded the admission, not currently under the influence of alcohol or illicit substances, denies experiencing command hallucinations, and no immediate access to firearms. Protective factors include: voluntarily seeking mental health support, displays resiliency , established relationship community mental health provider(s), future focused thinking, displays insight, expresses desire to engage in treatment, and sense of obligation to people/pets.         Patient coping skills attempted to reduce the crisis:  Pt reported that she doesn't use any coping strategies. " She came to the ED today.    Disposition  Recommended disposition: Individual Therapy, Medication Management        Reviewed case and recommendations with attending provider. Attending Name: Dr. Mullins       Attending concurs with disposition: yes       Patient and/or validated legal guardian concurs with disposition:   yes       Final disposition:  discharge    Legal status on admission: Voluntary/Patient has signed consent for treatment    Assessment Details   Total duration spent with the patient: 50 min     CPT code(s) utilized: 62544 - Psychotherapy for Crisis - 60 (30-74*) min    Wandy Wilkerson Psychotherapist  [facility] - Triage & Transition Services  Callback: [phone]

## 2024-01-21 NOTE — ED PROVIDER NOTES
History     Chief Complaint:  Panic Attack       HPI   Glenys Vuong is a 33 year old who presents with palpitations which she describes as panic attacks.  She states that she has longstanding anxiety/depression and panic attacks.  This feels consistent with previous episodes.  She states that she had a break-up with his significant other earlier today and has been quite distraught about this.  She relates chest discomfort, nausea, headache.  She relays chronic suicidal ideation but states that this has been a longstanding issue and she would never act on it.  She states that she has been taking her medications including antidepressants and has 2 therapist.  She states that she wants to get her heart checked out to make sure there is nothing wrong with it given the ongoing palpitations and chest discomfort.      Independent Historian:    None    Review of External Notes:  Previous urgent care visit and emergency department visit from November 17 were reviewed    Medications:    clonazePAM (KLONOPIN) 0.5 MG tablet  escitalopram (LEXAPRO) 10 MG tablet  gabapentin (NEURONTIN) 300 MG capsule  propranolol (INDERAL) 10 MG tablet        Past Medical History:    Past Medical History:   Diagnosis Date    Anxiety     Anxiety     Depression     Depressive disorder     PONV (postoperative nausea and vomiting)     PTSD (post-traumatic stress disorder)        Past Surgical History:    Past Surgical History:   Procedure Laterality Date    LUMBAR EPIDURAL INJECTION      CA INJ DX/THER AGNT PARAVERT FACET JOINT,IMG GUIDE,LUMBAR/SAC, 1ST LEVEL N/A 9/28/2020    Procedure: ENDOSCOPIC RIGHT L5, MEDIAL, S1, S2, S3 LATERAL BRANCH NERVE TRANSECTION;  Surgeon: Ruben Thompson MD;  Location: [facility];  Service: Pain          Physical Exam   Patient Vitals for the past 24 hrs:   BP Temp Temp src Pulse Resp SpO2   01/20/24 1849 113/45 98.8  F (37.1  C) Tympanic 70 16 98 %   01/20/24 1838 104/43 98.1  F (36.7  C) Temporal 65  14 97 %        Physical Exam  General: Alert, interactive in mild distress  Head:  Scalp is atraumatic  Eyes:  The pupils are equal, round, and reactive to light    EOM's intact    No scleral icterus  ENT:      Nose:  The external nose is normal  Ears:  External ears are normal      Neck:  Normal range of motion.      There is no rigidity.    Trachea is in the midline         CV:  Regular rate and rhythm    No murmur   Resp:  Breath sounds are clear bilaterally    Non-labored, no retractions or accessory muscle use      GI:  Abdomen is soft, no distension, no tenderness.       MS:  Normal strength in all 4 extremities  Skin:  Warm and dry, No rash or lesions noted.  Neuro:   Strength 5/5 x4.    Psych: Awake. Alert.  Anxious affect.  Relay suicidal ideation but no plan to ever act on it, states this has been a chronic issue    Appropriate interactions.  Emergency Department Course   ECG  ECG taken at 1854, ECG read at 1918  Normal sinus rhythm with sinus arrhythmia  Normal ECG   When compared with prior ECG, dated 5/11/18, no significant changes noted  Rate 63 bpm. NV interval 154 ms. QRS duration 92 ms. QT/QTc 460/470 ms. P-R-T axes 62 80 26.    Laboratory:  Labs Ordered and Resulted from Time of ED Arrival to Time of ED Departure   COMPREHENSIVE METABOLIC PANEL - Normal       Result Value    Sodium 139      Potassium 4.1      Carbon Dioxide (CO2) 27      Anion Gap 8      Urea Nitrogen 10.0      Creatinine 0.90      GFR Estimate 87      Calcium 8.9      Chloride 104      Glucose 89      Alkaline Phosphatase 44      AST 20      ALT 11      Protein Total 6.5      Albumin 4.2      Bilirubin Total 0.4     TROPONIN T, HIGH SENSITIVITY - Normal    Troponin T, High Sensitivity <6     HCG QUALITATIVE PREGNANCY - Normal    hCG Serum Qualitative Negative     CBC WITH PLATELETS AND DIFFERENTIAL    WBC Count 7.6      RBC Count 4.32      Hemoglobin 12.0      Hematocrit 36.8      MCV 85      MCH 27.8      MCHC 32.6      RDW 14.5       Platelet Count 174      % Neutrophils 69      % Lymphocytes 21      % Monocytes 7      % Eosinophils 2      % Basophils 1      % Immature Granulocytes 0      NRBCs per 100 WBC 0      Absolute Neutrophils 5.2      Absolute Lymphocytes 1.6      Absolute Monocytes 0.5      Absolute Eosinophils 0.2      Absolute Basophils 0.0      Absolute Immature Granulocytes 0.0      Absolute NRBCs 0.0          Procedures   None    Emergency Department Course & Assessments:    Interventions:  Medications   ondansetron (ZOFRAN ODT) ODT tab 4 mg (4 mg Oral $Given 1/20/24 1936)   diazepam (VALIUM) tablet 5 mg (5 mg Oral $Given 1/20/24 1936)        Assessments:  Patient was evaluated at the bedside    Independent Interpretation (X-rays, CTs, rhythm strip):  None    Consultations/Discussion of Management or Tests:  Patient was seen by one our mental health professionals       Social Determinants of Health affecting care:  Stress/Adjustment Disorders     Disposition:  The patient was discharged to home.     Impression & Plan    Medical Decision Making:  Following presentation history and physical examination were performed, the above workup was undertaken.  The patient was evaluated by one of our mental health 's.  At this point she appears to be having an acute stress reaction likely secondary to her break-up.  She has multiple outpatient resources and is struggled with depression anxiety in the past and feels comfortable discharge to home.  We discussed further evaluation in our [facility] unit but the patient declines.  At this point myself and the mental health  feel that she is safe for discharge and that is what she is requesting.  She will return the emergency department if new symptoms develop.  She will continue her home medications.      Diagnosis:    ICD-10-CM    1. Depression with anxiety  F41.8       2. Panic reaction  F41.0            Discharge Medications:  New Prescriptions    No medications on file           Sedrick Mullins MD  1/20/2024   Trigger, Sedrick Jacobson,*              Trigger, Sedrick Jacobson MD  01/20/24 7526

## 2024-01-21 NOTE — ED PROVIDER NOTES
History     Chief Complaint:  Psychiatric Evaluation       HPI   Glenys Vuong is a 33 year old female with a history of Anxiety and Depression who presents in the ED today due to anxiety. The patient notes that she is incapable of calming down and her anxiety has been continuing. She notes that she takes her medications and does ice packs and nothing helped her calm herself. She notes that she is always suicidal, but she currently feels heartbroken. She notes that she has back issues that she is seeing a doctor for on 1/26. She notes that she came back to the ED because she could not calm herself down.  She was in [facility] yesterday for similar symptoms.    Independent Historian:    None- The Patient only     Review of External Notes:  [facility] note from yesterday reviewed.  The patient was not thought to be at acute risk of harm to herself and her mental health symptoms were stable for outpatient management.    Medications:    Klonopin  Lexapro  Neurontin   Inderal    Past Medical History:    Anxiety   Depression   PONV  PTSD   Major depressive disorder    Past Surgical History:    Lumbar epidural injection     Physical Exam   Patient Vitals for the past 24 hrs:   BP Temp Temp src Pulse Resp SpO2   01/21/24 1240 123/61 98.2  F (36.8  C) Temporal 79 20 97 %        Physical Exam  General: Sitting on the ED chair  HEENT: Normocephalic, atraumatic  Cardiac: Well perfused  Pulm: Breathing comfortably, no accessory muscle usage, no conversational dyspnea  MSK: No bony deformities  Skin: Dry  Neuro: Moves all extremities  Psych: Tearful mood and affect       Emergency Department Course     Procedures   None    Emergency Department Course & Assessments:    PSS-3      Date and Time Over the past 2 weeks have you felt down, depressed, or hopeless? Over the past 2 weeks have you had thoughts of killing yourself? Have you ever attempted to kill yourself? When did this last happen? User   01/21/24 1242 yes yes yes -- EAS           C-SSRS ([place])      Date and Time Q1 Wished to be Dead (Past Month) Q2 Suicidal Thoughts (Past Month) Q3 Suicidal Thought Method Q4 Suicidal Intent without Specific Plan Q5 Suicide Intent with Specific Plan Q6 Suicide Behavior (Lifetime) Within the Past 3 Months? RETIRED: Level of Risk per Screen Screening Not Complete User   24 1242 yes yes yes yes no -- -- -- -- EAS                Suicide assessment completed by mental health (D.E.C., LCSW, etc.)    Interventions:  Medications   LORazepam (ATIVAN) tablet 1 mg (1 mg Oral $Given 24 1256)        Assessments:  1244 I obtained history and examined the patient as noted above.      Independent Interpretation (X-rays, CTs, rhythm strip):  None    Consultations/Discussion of Management or Tests:  None       Social Determinants of Health affecting care:  Stress/Adjustment Disorders     Disposition:  The patient was transferred to [facility].     Impression & Plan    CMS Diagnoses: None      Medical Decision Makin-year-old female presents with anxiety as above, unremitting since discharge yesterday.  She is visibly tearful and quite anxious on my evaluation.  She is otherwise medically stable, no indication for lab work or imaging or other workup in the ED.  She was given a dose of Ativan to help with her symptoms and is amenable to further targeted mental health treatment in [facility].  I believe this to be beneficial as well and plan is for the same.    Critical Care time:  was 0 minutes for this patient excluding procedures.    Diagnosis:    ICD-10-CM    1. Anxiety  F41.9             Scribe Disclosure:    I, Peter Corbett, am serving as a scribe at 1:05 PM on 2024 to document services personally performed by Hans Corbett MD based on my observations and the provider's statements to me.    2024   Hans Corbett MD King, Colin, MD  24 0414

## 2024-01-21 NOTE — PROGRESS NOTES
"33 year old female with history of panic attacks, anxiety, depression, and one previous suicide attempt received from ED due to increasing panic and hopelessness. Reports that she came to [facility] yesterday, but left instead of coming to [facility]. Pt shared that she regretted her decision to leave last night and that when she got home her anxiety felt worse as her current home life is complicated by living with her ex. Patient endorses fleeting thoughts that she is better off dead, but has no intent or plan for suicide. Denies hallucinations and HI. Pt has a therapist and PMHNP at [facility], but feels like her current plan isn't enough. Patient described being extremely scared and said while crying \"what if this doesn't work, then it will be one more thing that doesn't work, I am so scared.\". Nursing and risk assessments completed. Assessments reviewed with LMHP and physician. Admission information reviewed with patient. Patient given a tour of [facility] and instructions on using the facility. Questions regarding [facility] addressed. Pt safety search completed.     "

## 2024-01-21 NOTE — Clinical Note
Glenys Vuong was seen and treated in our emergency department on 1/21/2024.    She was discharged on 1/23/2024.     Sincerely,     [facility] Emergency Dept

## 2024-01-21 NOTE — ED TRIAGE NOTES
Patient presents to the ER for complaints of a panic attack. States that she was here last night where she was seen for the same thing and then went to [facility]. Patient going through a difficult break up at the time and feels unsafe at home. Chronic suicidal ideation but no intentions of acting on it. Looking to go back to [facility]. Crying in triage      Triage Assessment (Adult)       Row Name 01/21/24 1240          Triage Assessment    Airway WDL WDL        Respiratory WDL    Respiratory WDL WDL        Skin Circulation/Temperature WDL    Skin Circulation/Temperature WDL WDL        Cardiac WDL    Cardiac WDL WDL        Peripheral/Neurovascular WDL    Peripheral Neurovascular WDL WDL        Cognitive/Neuro/Behavioral WDL    Cognitive/Neuro/Behavioral WDL X     Mood/Behavior sad

## 2024-01-21 NOTE — Clinical Note
Glenys Vuong was seen and treated in our emergency department on 1/21/2024.         Sincerely,     [facility] Emergency Dept

## 2024-01-21 NOTE — ED NOTES
[facility]  ED to [facility] Checklist:      Goal for [facility]: Anxiety management    Current Behavior: Sad    Safety Concerns: Suicidal, no plan    Legal Hold Status: Voluntary    Medically Cleared by ED provider: Yes    Patient Therapeutically Searched: In triage    Belongings: Remain with patient    Independent Ambulation at Baseline: Yes/No: Yes    Participates in Care/Conversation: Yes/No: Yes    Patient Informed about [facility]: Yes/No: Yes    [facility]: Not ordered    Patient Ready to be Transferred to [facility]? Yes/No: Yes

## 2024-01-21 NOTE — Clinical Note
Glenys Vuong was seen and treated in our emergency department on 1/21/2024.    She was discharged on 1/23/2024.     Sincerely,     [facility] Emergency Dept

## 2024-01-22 VITALS
WEIGHT: 164.7 LBS | SYSTOLIC BLOOD PRESSURE: 116 MMHG | RESPIRATION RATE: 16 BRPM | TEMPERATURE: 98.7 F | HEART RATE: 74 BPM | HEIGHT: 63 IN | OXYGEN SATURATION: 98 % | BODY MASS INDEX: 29.18 KG/M2 | DIASTOLIC BLOOD PRESSURE: 78 MMHG

## 2024-01-22 PROCEDURE — 250N000013 HC RX MED GY IP 250 OP 250 PS 637: Performed by: NURSE PRACTITIONER

## 2024-01-22 PROCEDURE — G0378 HOSPITAL OBSERVATION PER HR: HCPCS

## 2024-01-22 PROCEDURE — 99233 SBSQ HOSP IP/OBS HIGH 50: CPT | Performed by: PSYCHIATRY & NEUROLOGY

## 2024-01-22 PROCEDURE — 250N000013 HC RX MED GY IP 250 OP 250 PS 637: Performed by: PSYCHIATRY & NEUROLOGY

## 2024-01-22 RX ORDER — GABAPENTIN 400 MG/1
400 CAPSULE ORAL AT BEDTIME
Status: DISCONTINUED | OUTPATIENT
Start: 2024-01-22 | End: 2024-01-23 | Stop reason: HOSPADM

## 2024-01-22 RX ORDER — GABAPENTIN 300 MG/1
300 CAPSULE ORAL 2 TIMES DAILY
Status: DISCONTINUED | OUTPATIENT
Start: 2024-01-23 | End: 2024-01-23 | Stop reason: HOSPADM

## 2024-01-22 RX ADMIN — TRAZODONE HYDROCHLORIDE 50 MG: 50 TABLET ORAL at 23:40

## 2024-01-22 RX ADMIN — HYDROXYZINE HYDROCHLORIDE 50 MG: 50 TABLET, FILM COATED ORAL at 10:41

## 2024-01-22 RX ADMIN — ESCITALOPRAM OXALATE 20 MG: 20 TABLET, FILM COATED ORAL at 08:41

## 2024-01-22 RX ADMIN — GABAPENTIN 200 MG: 100 CAPSULE ORAL at 13:54

## 2024-01-22 RX ADMIN — GABAPENTIN 200 MG: 100 CAPSULE ORAL at 08:40

## 2024-01-22 RX ADMIN — PROPRANOLOL HYDROCHLORIDE 10 MG: 10 TABLET ORAL at 09:59

## 2024-01-22 RX ADMIN — GABAPENTIN 400 MG: 400 CAPSULE ORAL at 21:56

## 2024-01-22 RX ADMIN — HYDROXYZINE HYDROCHLORIDE 50 MG: 50 TABLET, FILM COATED ORAL at 21:56

## 2024-01-22 ASSESSMENT — ACTIVITIES OF DAILY LIVING (ADL)
ADLS_ACUITY_SCORE: 35

## 2024-01-22 ASSESSMENT — COLUMBIA-SUICIDE SEVERITY RATING SCALE - C-SSRS
6. HAVE YOU EVER DONE ANYTHING, STARTED TO DO ANYTHING, OR PREPARED TO DO ANYTHING TO END YOUR LIFE?: NO
TOTAL  NUMBER OF ABORTED OR SELF INTERRUPTED ATTEMPTS SINCE LAST CONTACT: NO
2. HAVE YOU ACTUALLY HAD ANY THOUGHTS OF KILLING YOURSELF?: NO
TOTAL  NUMBER OF INTERRUPTED ATTEMPTS SINCE LAST CONTACT: NO
SUICIDE, SINCE LAST CONTACT: NO
ATTEMPT SINCE LAST CONTACT: NO
REASONS FOR IDEATION SINCE LAST CONTACT: MOSTLY TO END OR STOP THE PAIN (YOU COULDN'T GO ON LIVING WITH THE PAIN OR HOW YOU WERE FEELING)
1. SINCE LAST CONTACT, HAVE YOU WISHED YOU WERE DEAD OR WISHED YOU COULD GO TO SLEEP AND NOT WAKE UP?: YES

## 2024-01-22 NOTE — PROGRESS NOTES
Patient spent the remainder of the evening sleeping in recliner. Mostly kept to herself. Compliant with all medication. Mood remains dysregulated. Although she admitted that olanzapine was helpful. Nursing will continue to monitor for safety.

## 2024-01-22 NOTE — CARE PLAN
Glenys FAITH Yevgeniy  January 21, 2024  Plan of Care Hand-off Note     Patient Care Path: observation    Plan for Care:   It is the recommendation of this writer that pt remain overnight on observation status at [facility] to target her anxiety and insomnia. Pt presents with high levels of emotional dysregulation and tells this writer that she has been unable to sleep or eat following her break-up with her long-term partner. If pt's symptoms stabilize overnight, she may be able to discharge tomorrow. She would benefit from individual therapy with a provider specializing in complex trauma.    Identified Goals and Safety Issues: decrease anxiety and insomnia, increase appetite    Overview:  Keerthi Vuong, mother, PH: [phone]      Legal Status: voluntary/patient has signed consent for treatment     Psychiatry Consult: No psychiatry consult needed. Pt is at [facility].     Updated MD and RN regarding plan of care.      PAMELA KnowlesSW

## 2024-01-22 NOTE — ED PROVIDER NOTES
"[facility] Unit - Initial Psychiatric Observation Note  [facility] Emergency Department  Observation Initiation Date: 2024    Glenys Vuong MRN: [id number]   Age: 33 year old YOB: 1990     History     Chief Complaint   Patient presents with    Psychiatric Evaluation     HPI  Glenys \"Jossy Vuong is a 33 year old female with a past history notable for PTSD, major depression and anxiety, now with worsening mood regulation in the presence of heightened psychosocial stressors. She was cleared medically in the emergency department and transferred to [facility] for additional assessment and treatment planning. At the time of our meeting, she was nearing 6 hours in emergency care.    Please see the [facility] assessment & reassessment (if available), ED physician notes and nursing notes for additional information and collateral content if available. All were reviewed prior to meeting with the patient. Pertinent content includes the following: Father & uncle  by suicide. Her significant other of 8 years broke off their relationship yesterday morning.    On approach today, Favio is sitting in a recliner in the milieu eating dinner. She readily agreed to accompany me to a conference room for an interview. She appeared emotionally regulated while in the milieu, but soon after entering the conference room she started to cry and hyperventilate. She reiterated the trauma scenarios as described by SRIDHAR Knowles. She endorses passive SI, no HI, no A/V hallucinations, paranoia or delusional thoughts. She endorses significant grief and loss and reports that she feels abandoned. We spent time in the conference room working on de-escalation techniques including square breathing, and vagal nerve massage of the back of the neck/ears. It appeared that the more time spent in the interview, the deeper the emotional dysregulation took hold and the meeting was stopped in order to prevent further decompensation. She " "received Zyprexa soon after our meeting.      Past Medical History  Past Medical History:   Diagnosis Date    Anxiety     Anxiety     Depression     Depressive disorder     treatment resistant depresion dx in the past 6 months    PONV (postoperative nausea and vomiting)     Nausea after last back injection    PTSD (post-traumatic stress disorder)      Past Surgical History:   Procedure Laterality Date    LUMBAR EPIDURAL INJECTION      MD INJ DX/THER AGNT PARAVERT FACET JOINT,IMG GUIDE,LUMBAR/SAC, 1ST LEVEL N/A 9/28/2020    Procedure: ENDOSCOPIC RIGHT L5, MEDIAL, S1, S2, S3 LATERAL BRANCH NERVE TRANSECTION;  Surgeon: Ruben Thompson MD;  Location: [facility];  Service: Pain     clonazePAM (KLONOPIN) 0.5 MG tablet  escitalopram (LEXAPRO) 10 MG tablet  gabapentin (NEURONTIN) 300 MG capsule  propranolol (INDERAL) 10 MG tablet      No Known Allergies  Family History  No family history on file.  Social History   Social History     Tobacco Use    Smoking status: Never    Smokeless tobacco: Never   Substance Use Topics    Alcohol use: Yes     Alcohol/week: 6.0 standard drinks of alcohol     Types: 6 Cans of beer per week     Comment: socially, rarely    Drug use: Yes     Types: Marijuana          Review of Systems  A medically appropriate review of systems was performed with pertinent positives and negatives noted in the HPI, and all other systems negative.    Physical Examination   BP: 123/61  Pulse: 79  Temp: 98.2  F (36.8  C)  Resp: 20  Height: 160 cm (5' 3\")  Weight: 74.7 kg (164 lb 11.2 oz)  SpO2: 97 %    Physical Exam  General: Appears stated age.   Neuro: Alert and fully oriented. Extremities appear to demonstrate normal strength on visual inspection.   Integumentary/Skin: no rash visualized, normal color    Psychiatric Examination   Appearance: awake, alert  Attitude:  somewhat cooperative  Eye Contact:  poor   Mood:  anxious and depressed  Affect:  mood congruent, intensity is heightened, labile, and " reactive  Speech:  clear, coherent  Psychomotor Behavior:  no evidence of tardive dyskinesia, dystonia, or tics  Thought Process:  linear  Associations:  no loose associations  Thought Content:  no evidence of psychotic thought and passive suicidal ideation present  Insight:  fair  Judgement:  fair  Oriented to:  time, person, and place  Attention Span and Concentration:  fair  Recent and Remote Memory:  fair  Language: able to name/identify objects without impairment  Fund of Knowledge: intact with awareness of current and past events    ED Course        Labs Ordered and Resulted from Time of ED Arrival to Time of ED Departure - No data to display    Assessments & Plan (with Medical Decision Making)   Patient presenting with emotional dysregulation, anxiety, panic and depression in the presence of heightened psychosocial stress, grief and loss. At this point, her emotional regulation is the primary concern, and her symptoms are acute. She was unable to participate in a discussion about treatment planning other than to accept admission for observation and to accept medications that may help at this time. Nursing notes reviewed noting no acute issues other than what is already stated.     I have reviewed the assessment completed by the [facility].     During the observation period, the patient did require medications for agitation, and did not require restraints/seclusion for patient and/or provider safety.     The patient was found to have a psychiatric condition that would benefit from an observation stay in the emergency department for further psychiatric stabilization and/or coordination of a safe disposition. The observation plan includes serial assessments of psychiatric condition, potential administration of medications if indicated, further disposition pending the patient's psychiatric course during the monitoring period.     Preliminary diagnosis:    ICD-10-CM    1. Moderate episode of recurrent major depressive  disorder (H)  F33.1       2. Anxiety  F41.9       3. PTSD (post-traumatic stress disorder)  F43.10       4. Emotional dysregulation  R45.89       5. Generalized anxiety disorder with panic attacks  F41.1     F41.0            Treatment Plan:  - Will restart prior to admission medications including:escitalopram 20 mg daily for anxiety/depression, gabapentin 200 mg twice daily and 300 mg at bedtime for anxiety, propranolol 10 mg 3x/daily as needed for anxiety.  - EmPATH standing orders to treat pain, anxiety, agitation, insomnia and nausea.  - Admit for observation, and reassess tomorrow.  - Recommend that she remains in a sensory room overnight.    --  JOSE Rowell CNP   [facility] EMERGENCY DEPT  EmPATH Unit      Ramila Watson APRN CNP  01/21/24 2133

## 2024-01-22 NOTE — ED PROVIDER NOTES
EmPATH Unit - Psychiatric Consultation  [facility] Emergency Department    Glenys Vuong MRN: [id number]   Age: 33 year old YOB: 1990     History     Chief Complaint   Patient presents with    Psychiatric Evaluation     HPI  Glenys Vuong is a 33 year old female with history notable for major depressive disorder, generalized anxiety disorder, and PTSD who is currently under observation status on the EmPATH unit, now approaching 28 hours in the emergency department.  Overnight, there were no acute issues.  On reassessment today, the patient notes ongoing struggles with symptom intensity related to anxiety and moments of panic.  She notes that this symptom intensity is a reflection of an acute stressor, anticipating this to be time-limited, although admits to much difficulty tolerating and coping with her current symptom intensity.  She hopes to minimize medication usage of months as possible although appreciates the need to better utilize her current options.  Sleep has been challenging.  Propranolol has been helpful at helping to lessen her anxiety when needed.  At baseline, she finds her scheduled medications to be adequately helpful at minimizing symptom intensity.  Today, she denies suicidal and homicidal thoughts.  There was no indication of psychosis.  She would like to extend her stay on the unit 1 more night to gain further symptom reduction before returning home.    Past Medical History  Past Medical History:   Diagnosis Date    Anxiety     Anxiety     Depression     Depressive disorder     treatment resistant depresion dx in the past 6 months    PONV (postoperative nausea and vomiting)     Nausea after last back injection    PTSD (post-traumatic stress disorder)      Past Surgical History:   Procedure Laterality Date    LUMBAR EPIDURAL INJECTION      GA INJ DX/THER AGNT PARAVERT FACET JOINT,IMG GUIDE,LUMBAR/SAC, 1ST LEVEL N/A 9/28/2020    Procedure: ENDOSCOPIC RIGHT L5, MEDIAL, S1, S2, S3  "LATERAL BRANCH NERVE TRANSECTION;  Surgeon: Ruben Thompson MD;  Location: [facility] OR;  Service: Pain     clonazePAM (KLONOPIN) 0.5 MG tablet  escitalopram (LEXAPRO) 10 MG tablet  gabapentin (NEURONTIN) 300 MG capsule  propranolol (INDERAL) 10 MG tablet      No Known Allergies  Family History  No family history on file.  Social History   Social History     Tobacco Use    Smoking status: Never    Smokeless tobacco: Never   Substance Use Topics    Alcohol use: Yes     Alcohol/week: 6.0 standard drinks of alcohol     Types: 6 Cans of beer per week     Comment: socially, rarely    Drug use: Yes     Types: Marijuana          Review of Systems  A medically appropriate review of systems was performed with pertinent positives and negatives noted in the HPI, and all other systems negative.    Physical Examination   BP: 123/61  Pulse: 79  Temp: 98.2  F (36.8  C)  Resp: 20  Height: 160 cm (5' 3\")  Weight: 74.7 kg (164 lb 11.2 oz)  SpO2: 97 %    Physical Exam  General: Appears stated age.   Neuro: Alert and fully oriented. Extremities appear to demonstrate normal strength on visual inspection.   Integumentary/Skin: no rash visualized, normal color    Psychiatric Examination   Appearance: awake, alert  Attitude:  cooperative  Eye Contact:  fair  Mood:  anxious  Affect:  mood congruent  Speech:  clear, coherent  Psychomotor Behavior:  no evidence of tardive dyskinesia, dystonia, or tics  Thought Process:  logical and linear  Associations:  no loose associations  Thought Content:  no evidence of suicidal ideation or homicidal ideation and no evidence of psychotic thought  Insight:  fair  Judgement:  fair  Oriented to:  time, person, and place  Attention Span and Concentration:  fair  Recent and Remote Memory:  fair  Language: able to name/identify objects without impairment  Fund of Knowledge: intact with awareness of current and past events    ED Course        Labs Ordered and Resulted from Time of ED Arrival to Time of " ED Departure - No data to display    Assessments & Plan (with Medical Decision Making)   Patient presenting with difficulty with emotional regulation in the context of an acute psychosocial stressor leading to severe panic attacks.  Her treatment plan is focused on optimizing antianxiety interventions while monitoring her response and tolerability. Nursing notes reviewed noting no acute issues.     I have reviewed the assessment completed by the [facility].     Preliminary diagnosis:    ICD-10-CM    1. Moderate episode of recurrent major depressive disorder (H)  F33.1       2. PTSD (post-traumatic stress disorder)  F43.10       3. Generalized anxiety disorder with panic attacks  F41.1     F41.0            Treatment Plan:  -Increase gabapentin from 200 mg to 300 mg twice a day to aid in reducing anxiety  -Increase gabapentin from 300 mg to 400 mg nightly to help minimize insomnia  -Continue propranolol 10 mg 3 times a day as needed for anxiety with a backup of hydroxyzine when needed.  -Continue Lexapro 20 mg daily for antidepressant treatment  -Continue observation status for 1 more night in anticipation of achieving readiness to discharge home tomorrow.    --  Abiel Hannon MD   [facility] EMERGENCY DEPT  EmPATH Unit       Abiel Hannon MD  01/22/24 6034

## 2024-01-22 NOTE — PROGRESS NOTES
Progress Note (short form)





- Note


Progress Note: 


OOB to chair. 


Reports breathing feels a little better. 


Daughter at the bedside confirms breathing appears better. 


No acute events overnight. 





 Intake & Output











 12/04/19 12/05/19 12/06/19 12/07/19





 23:59 23:59 23:59 23:59


 


Intake Total 1140 540 540 540


 


Output Total 3   


 


Balance 1137 540 540 540


 


Weight 78 lb 3 oz 79 lb 6 oz  80 lb








 Last Vital Signs











Temp Pulse Resp BP Pulse Ox


 


 98 F   68   18   114/58 L  97 


 


 12/07/19 05:44  12/07/19 05:44  12/07/19 05:44  12/07/19 05:44  12/06/19 22:00








Active Medications





Acetaminophen (Tylenol -)  500 mg PO Q8H PRN


   PRN Reason: PAIN LEVEL 1-5


   Last Admin: 12/03/19 21:10 Dose:  500 mg


Artificial Tears (Artificial Tears)  1 drop OU Q12H PRN


   PRN Reason: ALLERGIES


Enoxaparin Sodium (Lovenox -)  30 mg SQ DAILY [facility]


   Last Admin: 12/06/19 09:57 Dose:  30 mg


Escitalopram Oxalate (Lexapro Oral Solution -)  5 mg GT DAILY [facility]


   Last Admin: 12/06/19 10:24 Dose:  5 mg


Furosemide (Lasix Injection -)  40 mg IVPUSH DAILY [facility]


   Last Admin: 12/06/19 09:56 Dose:  40 mg


Levetiracetam (Keppra Oral Solution -)  500 mg PEG BID [facility]


   Last Admin: 12/06/19 21:28 Dose:  500 mg


Levothyroxine Sodium (Synthroid -)  50 mcg GT DAILY@0700 [facility]


   Last Admin: 12/07/19 06:04 Dose:  50 mcg


Pantoprazole Sodium (Protonix Packets For Oral Suspension -)  40 mg PEG DAILY 

[facility]


   Last Admin: 12/06/19 10:24 Dose:  40 mg


Polyethylene Glycol (Miralax (For Daily Use) -)  17 gm GT DAILY PRN


   PRN Reason: CONSTIPATION








Gen: Awake and alert, Mildly tachypneic


Heart: RRR


Lung: decreased breath sounds on the right 


Abd: soft, nontender


Ext: no edema


CNS: non-focal 





 Laboratory Results - last 24 hr











  12/07/19 12/07/19





  08:25 08:25


 


WBC  7.2 


 


RBC  3.17 L 


 


Hgb  9.6 L 


 


Hct  28.5 L 


 


MCV  89.9 


 


MCH  30.3 


 


MCHC  33.8 


 


RDW  17.1 H 


 


Plt Count  421 


 


MPV  8.1 


 


Absolute Neuts (auto)  5.0 


 


Neutrophils %  69.1 


 


Lymphocytes %  10.9  D 


 


Monocytes %  18.1 H 


 


Eosinophils %  1.5 


 


Basophils %  0.4 


 


Nucleated RBC %  0 


 


Sodium   134 L


 


Potassium   3.7


 


Chloride   88 L


 


Carbon Dioxide   40 H


 


Anion Gap   6 L


 


BUN   21.8 H


 


Creatinine   0.5 L


 


Est GFR (CKD-EPI)AfAm   100.15


 


Est GFR (CKD-EPI)NonAf   86.41


 


Random Glucose   98


 


Calcium   8.4 L


 


Phosphorus   3.3


 


Magnesium   2.3


 


Total Bilirubin   0.2


 


AST   27


 


ALT   38


 


Alkaline Phosphatase   91


 


Total Protein   5.6 L


 


Albumin   2.1 L











IMP: 


Pleural Effusions likely from 3rd spacing vs Volume Overload


Metastatic Lung Cancer with Leptomeningeal involvement


Failure to Thrive


HTN


Hyperlipidemia


Hypothyroidism








-  Lasix 


-  monitor urine output, creatinine


-  daily weights


-  O2 to keep SpO2>90%


-  repeat CXR Monday AM to see if a thoracentesis is still required 


-  DVT prophylaxis





Dr Burrell Pt slept for a bit, refused lunch, continues to rest in a Sensory room, appears more calm.

## 2024-01-22 NOTE — CARE PLAN
Triage & Transition Services, Extended Care     Client Name: Glenys Vuong    Date: January 21, 2024    Patient was seen    Mode of Assessment:      Service Type: (P) other (see comments) (pt was on the phone)  Session Start Time:  (P) 1930    Session End Time: (P) 1950  Session Length: (P) 20  Site Location: [facility] EMERGENCY DEPT                             [id number]  Total Number ofAttendees: (P) 2  Topic:   (P) emotions/expression   Response: (P) other (see comments) (pt was on the phone)     Marya Winkler [facility]   Licensed Mental Health Professional (LMHP), Extended Care  [phone]

## 2024-01-22 NOTE — PROGRESS NOTES
Pt received her afternoon dose of gabapentin, came out to unit for snacks and is making phone calls. She remains anxious, but is not as dysregulated and has no sense of panic at this time.

## 2024-01-22 NOTE — PROGRESS NOTES
"Patient is very dysregulated currently. She expressed frustration with life itself. Stated she feels hopeless because \"it's always the same every time.\" She spoke at length about her long history of traumas and said she is afraid of everything currently. Her feelings were acknowledged and validated. She currently denies any suicidal ideations or any thoughts of self-harm. She was offered 10 mg of olanzapine PRN. Patient was able to speak to her mother and said it helped. Patient was offered a sensory room, but she said she did not want to be alone. Reassurance  was provided. Nursing will continue to monitor the patient or safety.  "

## 2024-01-22 NOTE — PROGRESS NOTES
"Pt was awake for breakfast this AM, she was crying. Pt reports, \"everything is a trigger, I have been anxious for 3 days, I just want to be able to think.\" Writer spent time listening and providing distractions, which have not been effective. Pt has been in tears and seeking writer the entire morning. She has been given a PRN dose of hydroxyzine and Propranolol, which appear to have given little relief, she reports \"tingling and panic.\" Vitals are stable. reassurance provided. Set her up in a Sensory room, hot and cold packs provided, with a weighted and a warm blanket. [facility] spent time some time with her, she is resting at this time. Will monitor.   "
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
## 2024-01-22 NOTE — PROGRESS NOTES
"Triage & Transition Services, Extended Care     Therapy Progress Note    Patient: Glenys goes by \"Glenys,\" uses she/her pronouns  Date of Service: January 22, 2024  Site of Service: [facility] EMERGENCY DEPT                             EMP13  Patient was seen yes  Mode of Assessment: In person    Presentation Summary: Writer met with patient in a sensory room. Patient is calm but anxious, intermittently crying, shaking and fidgeting often. She reports that at baseline, she has significant depression and anxiety symptoms however in the last 3 days since her fiance ended their relationship, her anxiety has been significantly worsening. Patient reports daily panic attacks with symptoms including crying, chest tightness, headaches and shaking. Patient continues to present with anxiety but shows good insight into symptoms. She endorses struggling with utilizing effective coping skills as they are \"triggering\" for her as this is what she did with her fiance (coloring, music, etc). Writer and patient engage in ongoing conversation related to other skills and ways to continue managing anxiety and depressive symptoms while working through grief and loss of her relationship. She feels one more day of observation would be beneficial and is agreeable with this plan.    Therapeutic Intervention(s) Provided: Engaged in cognitive restructuring/ reframing, looked at common cognitive distortions and challenged negative thoughts., Coached on coping techniques/relaxation skills to help improve distress tolerance and managing intense emotions., Identified and practiced coping skills.    Current Symptoms: panic attack, racing thoughts, excessive worry, shortness of breath or racing heart, anxious            Mental Status Exam   Affect: Appropriate  Appearance: Appropriate  Attention Span/Concentration: Attentive  Eye Contact: Variable    Fund of Knowledge: Appropriate   Language /Speech Content: Fluent  Language " /Speech Volume: Normal  Language /Speech Rate/Productions: Normal  Recent Memory: Intact  Remote Memory: Intact  Mood: Anxious, Sad  Orientation to Person: Yes   Orientation to Place: Yes  Orientation to Time of Day: Yes  Orientation to Date: Yes     Situation (Do they understand why they are here?): Yes  Psychomotor Behavior: Normal  Thought Content: Clear  Thought Form: Intact    Treatment Objective(s) Addressed: rapport building, orienting the patient to therapy, identifying and practicing coping strategies, processing feelings, assessing safety    Patient Response to Interventions: acceptance expressed, verbalizes understanding    Progress Towards Goals: Patient Reports Symptoms Are: ongoing  Patient Progress Toward Goals: is making progress  Next Step to Work Toward Discharge: symptom stabilization  Symptom Stabilization Comment: decreased anxiety, working through identifying coping skills    Case Management: Case Management Included: completing or following up on referrals  Details on completing or following up on referrals: Patient signed SIENA for provider at [facility] to ensure follow up with provider following discharge    Plan: observation  yes provider, RN, in agreement    Clinical Substantiation: Patient continues to endorse ongoing anxiety, panic and depressive symptoms. She is agreeable to boarding in observation one more night to continue to monitor symptoms and undergo medication changes. She has a therapist and psychaitry provider that she works with in the community for follow up. Writer did discuss consideration for PHP/day treatment which she wants to think about. Patient agreeable with plan for reassessment and safety planning tomorrow after a night's sleep.    Legal Status: Legal Status at Admission: Voluntary/Patient has signed consent for treatment    Session Status: Time session started: 1127  Time session ended: 1207  Session Duration (minutes): 40 minutes  Session Number: 1  Anticipated  number of sessions or this episode of care: 2  Date of most recent diagnostic assessment: unknown    Time Spent: 40 minutes    CPT Code: CPT Codes: 04105 - Psychotherapy (with patient) - 45 (38-52*) min    Diagnosis:   Patient Active Problem List   Diagnosis Code    Fall W19.XXXA    Major depressive disorder, recurrent episode, moderate (H) F33.1    Generalized anxiety disorder with panic attacks F41.1, F41.0    Posttraumatic stress disorder F43.10       Primary Problem This Admission: Active Hospital Problems   *Generalized anxiety disorder with panic attacks (F41.1)   Posttraumatic stress disorder (F43.1)   Major depressive disorder, recurrent episode, moderate (F33.1)        Leta Vasquez   Licensed Mental Health Professional (LMHP), [facility]  [phone]

## 2024-01-23 PROCEDURE — 250N000013 HC RX MED GY IP 250 OP 250 PS 637: Performed by: NURSE PRACTITIONER

## 2024-01-23 PROCEDURE — G0378 HOSPITAL OBSERVATION PER HR: HCPCS

## 2024-01-23 PROCEDURE — 250N000013 HC RX MED GY IP 250 OP 250 PS 637: Performed by: PSYCHIATRY & NEUROLOGY

## 2024-01-23 PROCEDURE — 99239 HOSP IP/OBS DSCHRG MGMT >30: CPT | Performed by: PSYCHIATRY & NEUROLOGY

## 2024-01-23 RX ORDER — TRAZODONE HYDROCHLORIDE 50 MG/1
50 TABLET, FILM COATED ORAL
Qty: 10 TABLET | Refills: 0 | Status: SHIPPED | OUTPATIENT
Start: 2024-01-23

## 2024-01-23 RX ORDER — ESCITALOPRAM OXALATE 20 MG/1
20 TABLET ORAL DAILY
COMMUNITY
Start: 2024-01-23

## 2024-01-23 RX ORDER — CLONAZEPAM 0.5 MG/1
0.5 TABLET ORAL 2 TIMES DAILY
Status: DISCONTINUED | OUTPATIENT
Start: 2024-01-23 | End: 2024-01-23 | Stop reason: HOSPADM

## 2024-01-23 RX ORDER — HYDROXYZINE PAMOATE 50 MG/1
50 CAPSULE ORAL 3 TIMES DAILY PRN
Qty: 20 CAPSULE | Refills: 0 | Status: SHIPPED | OUTPATIENT
Start: 2024-01-23

## 2024-01-23 RX ORDER — CLONAZEPAM 0.5 MG/1
0.5 TABLET ORAL 3 TIMES DAILY
COMMUNITY
Start: 2024-01-23

## 2024-01-23 RX ORDER — PROPRANOLOL HYDROCHLORIDE 10 MG/1
10 TABLET ORAL 2 TIMES DAILY PRN
Qty: 10 TABLET | Refills: 0 | Status: SHIPPED | OUTPATIENT
Start: 2024-01-23

## 2024-01-23 RX ORDER — CLONAZEPAM 0.5 MG/1
0.5 TABLET ORAL DAILY PRN
Status: DISCONTINUED | OUTPATIENT
Start: 2024-01-23 | End: 2024-01-23 | Stop reason: HOSPADM

## 2024-01-23 RX ORDER — GABAPENTIN 400 MG/1
400 CAPSULE ORAL AT BEDTIME
Qty: 30 CAPSULE | Refills: 0 | Status: SHIPPED | OUTPATIENT
Start: 2024-01-23

## 2024-01-23 RX ORDER — GABAPENTIN 300 MG/1
300 CAPSULE ORAL 2 TIMES DAILY
Qty: 60 CAPSULE | Refills: 0 | Status: SHIPPED | OUTPATIENT
Start: 2024-01-23

## 2024-01-23 RX ADMIN — ESCITALOPRAM OXALATE 20 MG: 20 TABLET, FILM COATED ORAL at 08:20

## 2024-01-23 RX ADMIN — GABAPENTIN 300 MG: 300 CAPSULE ORAL at 08:21

## 2024-01-23 RX ADMIN — CLONAZEPAM 0.5 MG: 0.5 TABLET ORAL at 12:49

## 2024-01-23 RX ADMIN — PROPRANOLOL HYDROCHLORIDE 10 MG: 10 TABLET ORAL at 14:27

## 2024-01-23 RX ADMIN — GABAPENTIN 300 MG: 300 CAPSULE ORAL at 13:28

## 2024-01-23 RX ADMIN — HYDROXYZINE HYDROCHLORIDE 50 MG: 50 TABLET, FILM COATED ORAL at 09:27

## 2024-01-23 ASSESSMENT — COLUMBIA-SUICIDE SEVERITY RATING SCALE - C-SSRS
1. SINCE LAST CONTACT, HAVE YOU WISHED YOU WERE DEAD OR WISHED YOU COULD GO TO SLEEP AND NOT WAKE UP?: NO
ATTEMPT SINCE LAST CONTACT: NO
SUICIDE, SINCE LAST CONTACT: NO
TOTAL  NUMBER OF ABORTED OR SELF INTERRUPTED ATTEMPTS SINCE LAST CONTACT: NO
2. HAVE YOU ACTUALLY HAD ANY THOUGHTS OF KILLING YOURSELF?: NO
TOTAL  NUMBER OF INTERRUPTED ATTEMPTS SINCE LAST CONTACT: NO
6. HAVE YOU EVER DONE ANYTHING, STARTED TO DO ANYTHING, OR PREPARED TO DO ANYTHING TO END YOUR LIFE?: NO

## 2024-01-23 ASSESSMENT — ACTIVITIES OF DAILY LIVING (ADL)
ADLS_ACUITY_SCORE: 35

## 2024-01-23 NOTE — ED PROVIDER NOTES
EmPATH Unit - Psychiatric Observation Discharge Summary  [facility] Emergency Department  Discharge Date: 1/23/2024    Glenys Vuong MRN: [id number]   Age: 33 year old YOB: 1990     Brief HPI & Initial ED Course     Chief Complaint   Patient presents with    Psychiatric Evaluation     HPI  Glenys Vuong is a 33 year old female with history notable for major depressive disorder, PTSD, and generalized anxiety disorder who is currently under observation status on the EmPATH unit, now approaching 49 hours in the emergency department.  Overnight, there were no acute issues.  On reassessment today, the patient reports that overall her heightened state of anxiety has decreased although she continues to experience moments of physical discomfort related to anxiety and panic.  As we talked more regarding her symptoms, there was appreciation for the likelihood of benzodiazepine withdrawal symptoms while reflecting on the fact that she has not had a dose of Klonopin during her stay thus far.  Otherwise, she is gaining benefit from utilizing hydroxyzine and propranolol when needed.  She is tolerating the higher dose of gabapentin without side effects.  Sleep onset was difficult last night although was able to achieve adequate sleep after taking the dose of trazodone.  While utilizing these additional medications, she is able to more effectively utilize coping strategies.  Her longer term hope is to minimize medication use and prioritize mindfulness practices to regulate emotions more effectively.  She denied active suicidal and homicidal thoughts.  There is no indication of psychosis.  She anticipates having a phone conversation with her ex-partner at around 2 PM at which time they will discuss details related to the patient returning home.  She is awaiting the outcome of that conversation before finalizing her disposition plan although suspects that the phone call will be beneficial and help alleviate  "uncertainties around her expectations after returning home.        Physical Examination   BP: 116/78  Pulse: 74  Temp: 98.7  F (37.1  C)  Resp: 16  Height: 160 cm (5' 3\")  Weight: 74.7 kg (164 lb 11.2 oz)  SpO2: 98 %    Physical Exam  General: Appears stated age.   Neuro: Alert and fully oriented. Extremities appear to demonstrate normal strength on visual inspection.   Integumentary/Skin: no rash visualized, normal color    Psychiatric Examination   Appearance: awake, alert  Attitude:  cooperative  Eye Contact:  fair  Mood:  anxious, sad , and better  Affect:  mood congruent  Speech:  clear, coherent  Psychomotor Behavior:  no evidence of tardive dyskinesia, dystonia, or tics  Thought Process:  logical and linear  Associations:  no loose associations  Thought Content:  no evidence of suicidal ideation or homicidal ideation and no evidence of psychotic thought  Insight:  fair  Judgement:  fair  Oriented to:  time, person, and place  Attention Span and Concentration:  fair  Recent and Remote Memory:  fair  Language: able to name/identify objects without impairment  Fund of Knowledge: intact with awareness of current and past events    Results        Labs Ordered and Resulted from Time of ED Arrival to Time of ED Departure - No data to display    Observation Course   The patient was found to have a psychiatric condition that would benefit from an observation stay in the emergency department for further psychiatric stabilization and/or coordination of a safe disposition. The plan upon observation admission included serial assessments of psychiatric condition, potential administration of medications if indicated, further disposition pending the patient's psychiatric course during the monitoring period.     Serial assessments of the patient's psychiatric condition were performed. Nursing notes were reviewed. During the observation period, the patient did not require medications for agitation, and did not require " restraints/seclusion for patient and/or provider safety.     After a period of working with the treatment team on the EmPATH unit, the patient's mental state improved to allow a safe transition to outpatient care. After counseling on the diagnosis, work-up, and treatment plan, the patient was discharged. Close follow-up with a psychiatrist and/or therapist was recommended and community psychiatric resources were provided. Patient is to return to the ED if any urgent or potentially life-threatening concerns.     Discharge Diagnoses:   Final diagnoses:   Moderate episode of recurrent major depressive disorder (H)   PTSD (post-traumatic stress disorder)   Generalized anxiety disorder with panic attacks       Treatment Plan:  -Restart Klonopin 0.5 mg twice a day for reduction of anxiety while utilizing her third dose if needed.  Noting that the patient has been off of Klonopin during her stay thus far, it is possible that she does not require full dosing for relief.  Her longer term plan is to gradually taper off of this medication.  Utilizing gabapentin and her other as needed medications may make that goal more attainable.  -Continue the higher dose of gabapentin: 300 mg in the morning, 300 mg in the afternoon, and 400 mg at nighttime.  -Continue hydroxyzine 25 mg 3 times a day as needed for reduction of anxiety with a backup of propranolol 10 mg if needed.  -Resume individual psychotherapy and outpatient medication management appointments  -Anticipate discharge home later this afternoon.      At the time of discharge, the patient's acute suicide risk was determined to be low due to the following factors: Reduction in the intensity of mood/anxiety symptoms that preceded the admission, denial of suicidal thoughts, denies feeling helpless or helpless, not currently under the influence of alcohol or illicit substances, denies experiencing command hallucinations, no immediate access to firearms. The patient's acute risk  could be higher if noncompliant with their treatment plan, medications, follow-up appointments or using illicit substances or alcohol. Protective factors include: social supports, stable housing    I spent more than 30 minutes on discharge day activities.    --  Abiel Hannon MD  [facility] EMERGENCY DEPT  EmPATH Unit       Abiel Hannon MD  01/23/24 3469

## 2024-01-23 NOTE — ED NOTES
Pts anxiety  still high. Gabapentin given. Will cont to monitor and give as needed propanolol if needed

## 2024-01-23 NOTE — ED NOTES
Pt very anxious 9/10. She had her Gabapentin this AM at 0827 and then wanted Hydroxyzine 0927. Still highly anxious. Is working  on coloring and breathing. Will monitor.  Pt ate breakfast and is drinking.

## 2024-01-23 NOTE — PROGRESS NOTES
Patient was observed sitting out in the milieu coloring. She has been making phone calls. Mood remains anxious, but calm. She is bright upon approach.

## 2024-01-23 NOTE — PROGRESS NOTES
"Triage and Transition Services Extended Care Reassessment     Patient: Glenys goes by \"Glenys,\" uses she/her pronouns  Date of Service: January 23, 2024  Site of Service: [facility] EMERGENCY DEPT                             EMP13  Patient was seen yes  Mode of Assessment: In person     Reason for Reassessment: other (see comment) (anxiety and discharge planning)    History of Patient's Original Emergency Room Encounter: extensive of trauma; depression, anxiety    Current Patient Presentation: anxious yet hopeful    Presentation Summary: Patient was sitting in her recliner when writer approached to meet in consult room B. She accepted. Patient states she is feeling very anxious this morning however it is better than when she arrived on Saturday. She notes being able to think more clearly, make better eye contact, and engage in coping skills that were not effective leading up to presentation here. Patient is observed to be experiencing shaking hands, palm sweaty, and speech is slightly delayed as well. Patient has insight that she has had too much going on recently that lead up to this anxiety break - work, school, relationship ending, and trauma triggers. Patient has been engaged with outpatient providers for therapy (since October 2023) and medication management (since pre-pandemic). Patient indicates wanting to continue working with these providers and understands that if she wants different providers in the near future, she can call us to get those new appointments scheduled. Patient was accepting of educational handouts regarding Anxiety Spiral and other Grounding Techniques. She was able to review the Anxiety Spiral and found it very helpful to visualize when to try interventions including medications. Patient is leaning toward discharge today after having a phone call with her ex-partner to finalize details and expectations of home as they still live together.    Changes Observed Since " Initial Assessment: decrease in presenting symptoms, patient/family request    Therapeutic Interventions Provided: Engaged in cognitive restructuring/ reframing, looked at common cognitive distortions and challenged negative thoughts., Engaged in guided discovery, explored patient's perspectives and helped expand them through socratic dialogue., Coached on coping techniques/relaxation skills to help improve distress tolerance and managing intense emotions., Taught the link between thoughts, feelings, and behaviors., Reviewed healthy living that supports positive mental health, including looking at sleep hygiene, regular movement, nutrition, and regular socialization., Provided positive reinforcement for progress towards goals, gains in knowledge, and application of skills previously taught., Identified and practiced coping skills.    Current Symptoms: excessive worry, anxious withdrawal/isolation, helplessness excessive worry, anxious        Mental Status Exam   Affect: Appropriate  Appearance: Appropriate  Attention Span/Concentration: Attentive  Eye Contact: Engaged    Fund of Knowledge: Appropriate   Language /Speech Content: Fluent  Language /Speech Volume: Normal  Language /Speech Rate/Productions: Normal  Recent Memory: Intact  Remote Memory: Intact  Mood: Anxious  Orientation to Person: Yes   Orientation to Place: Yes  Orientation to Time of Day: Yes  Orientation to Date: Yes     Situation (Do they understand why they are here?): Yes  Psychomotor Behavior: Normal  Thought Content: Clear  Thought Form: Intact    Treatment Objective(s) Addressed: rapport building, orienting the patient to therapy, identifying and practicing coping strategies, processing feelings, identifying an appropriate aftercare plan, assessing safety, identifying additional supports    Patient Response to Interventions: eager to participate, acceptance expressed, verbalizes understanding    Progress Towards Goals:  Patient Reports Symptoms  Are: improving  Patient Progress Toward Goals: is making progress  Comment: less anxious  Next Step to Work Toward Discharge: symptom stabilization  Symptom Stabilization Comment: decreased anxiety, working through identifying coping skills    Case Management: Case Management Included: completing or following up on referrals  Details on completing or following up on referrals: Patient signed SIENA for provider at [facility] to ensure follow up with provider following discharge    C-SSRS Since Last Contact:   1. Wish to be Dead (Since Last Contact): No  Wish to be Dead Description (Since Last Contact): Patient reports baseline thoughts of wishing she could be dead or go to sleep and not wake up. She denies any plan or intent.  2. Non-Specific Active Suicidal Thoughts (Since Last Contact): No  Most Severe Ideation Rating (Since Last Contact): 2  Frequency (Since Last Contact): Daily or almost daily  Duration (Since Last Contact): Fleeting, few seconds or minutes  Controllability (Since Last Contact): Can control thoughts with little difficulty  Deterrents (Since Last Contact): Deterrents definitely stopped you from attempting suicide  Reasons for Ideation (Since Last Contact): Mostly to end or stop the pain (You couldn't go on living with the pain or how you were feeling)  Actual Attempt (Since Last Contact): No  Has subject engaged in non-suicidal self-injurious behavior? (Since Last Contact): No  Interrupted Attempts (Since Last Contact): No  Aborted or Self-Interrupted Attempt (Since Last Contact): No  Preparatory Acts or Behavior (Since Last Contact): No  Suicide (Since Last Contact): No     Calculated C-SSRS Risk Score (Since Last Contact): No Risk Indicated    Plan: Final Disposition / Recommended Care Path: discharge  Plan for Care reviewed with assigned Medical Provider: yes  Plan for Care Team Review: provider, RN  Comments: Andish  Patient and/or validated legal guardian concurs: yes  Clinical  Substantiation: Patient is identifying continued anxiety today however is able manage it better than upon presentation.  She engaged in discussion about how to manage ongoing anxiety through combination of medications (with support of psychiatry) and therapeutic techniques. Patient appreciated the handout of The Anxiety Spiral as it helped her visualize and connect when medication intervention is warranted and when traditional coping skills are needed.   Patient acknowledges that her life has too many moving pieces or transitions that make it difficult to navigate life currently.  Patient has more hope with current medication regimen, outpatient supports and time here.  Patient denies current SI, HI, AH, VH, paranoia and delusions.  She will plan to discharge home after a 1400 phone call with ex-partner to review expectations and set boundaries.    Legal Status: Legal Status at Admission: Voluntary/Patient has signed consent for treatment    Session Status: Time session started: 0930  Time session ended: 0950  Session Duration (minutes): 20 minutes  Session Number: 2  Anticipated number of sessions or this episode of care: 2  Date of most recent diagnostic assessment:  (unknown)    Session Start Time: 0930  Session Stop Time: 0950  CPT codes: 85445 - Psychotherapy (with patient) - 30 (16-37*) min  Time Spent: 20 minutes      CPT code(s) utilized: 53141 - Psychotherapy (with patient) - 30 (16-37*) min    Diagnosis:   Patient Active Problem List   Diagnosis Code    Fall W19.XXXA    Major depressive disorder, recurrent episode, moderate (H) F33.1    Generalized anxiety disorder with panic attacks F41.1, F41.0    Posttraumatic stress disorder F43.10       Primary Problem This Admission: Active Hospital Problems    *Generalized anxiety disorder with panic attacks      Posttraumatic stress disorder      Major depressive disorder, recurrent episode, moderate (H)      Jak Donovan, BRYCE, [facility]  Licensed Mental Health  Professional ([facility])  [facility], [phone]

## 2024-01-23 NOTE — CARE PLAN
Triage & Transition Services, Extended Care     Client Name: Glenys Vuong    Date: January 22, 2024    Patient was seen yes  Mode of Assessment: In person    Service Type: (P) attended group session  Session Start Time:  (P) 1930    Session End Time: (P) 2000  Session Length: (P) 30  Site Location: [facility] EMERGENCY DEPT                             EMP13  Total Number ofAttendees: (P) 4  Topic:   (P) coping skills/lifestyle management   Response: (P) discussed personal experience with topic, cooperative with task, listened actively     Marya Winkler [facility]   Licensed Mental Health Professional (LMHP), Extended Care  [phone]

## 2024-01-23 NOTE — PROGRESS NOTES
Triage & Transition Services, Extended Care     Client Name: Glenys Vuong    Date: January 23, 2024    Patient was seen yes  Mode of Assessment: In person    Service Type: attended group session  Session Start Time:  1025    Session End Time: 1035  Session Length: 10  Site Location: [facility] EMERGENCY DEPT                             EMP13  Total Number ofAttendees: 3  Topic:   problem-solving   Response: discussed personal experience with topic, cooperative with task, listened actively     Bradly Velazquez [facility]   Licensed Mental Health Professional (LMHP), Extended Care  [phone]

## 2024-01-23 NOTE — CARE PLAN
Triage & Transition Services, Extended Care     Client Name: Glenys Vuong    Date: January 23, 2024    Patient was seen yes  Mode of Assessment: In person    Service Type: (P) attended group session  Session Start Time:  (P) 1550    Session End Time: (P) 1615  Session Length: (P) 25  Site Location: [facility] EMERGENCY DEPT                             EMP13  Total Number ofAttendees: (P) 3  Topic:   (P) coping skills/lifestyle management   Response: (P) listened actively, cooperative with task     Marya Winkler [facility]   Licensed Mental Health Professional (LMHP), Extended Care  [phone]

## 2024-01-23 NOTE — DISCHARGE INSTRUCTIONS
Aftercare Plan  If I am feeling unsafe or I am in a crisis, I will:   Contact my established care providers   Call the National Suicide Prevention Lifeline: 988  Go to the nearest emergency room   Call 911     Continue working with your established outpatient providers for therapy and medication management      Additional Information  Today you were seen by a licensed mental health professional through Triage and Transition services, [facility] Providers (P)  for a crisis assessment in the Emergency Department at [facility].  It is recommended that you follow up with your established providers (psychiatrist, mental health therapist, and/or primary care doctor - as relevant) as soon as possible. Coordinators from [facility] will be calling you in the next 24-48 hours to ensure that you have the resources you need.  You can also contact [facility] coordinators directly at [phone]. You may have been scheduled for or offered an appointment with a mental health provider. [facility] maintains an extensive network of licensed behavioral health providers to connect patients with the services they need.  We do not charge providers a fee to participate in our referral network.  We match patients with providers based on a patient's specific needs, insurance coverage, and location.  Our first effort will be to refer you to a provider within your care system, and will utilize providers outside your care system as needed.

## 2024-01-23 NOTE — PROGRESS NOTES
Patient has been visible on the unit and more social with peers. Mood remains anxious with sad affect. She admitted to having less panic attacks tonight.Continues to endorse racing thoughts and excessive worrying. Vital signs are stable. Recent med changes include an increase of the dosage of gabapentin to 300 mg twice a day and the nightly dose increased to 400 mg. Plan is for patient to remain on observation tonight while undergoing medication changes. Nursing will continue to monitor for safety.

## 2024-01-24 NOTE — ED NOTES
Patient agrees to discharge plan. Discharge instructions reviewed with patient including follow-up care plan. Medications: gabapentin, propranolol, trazodone and hydroxyzine. Reviewed safety plan and outpatient resources. Denies SI and HI. All belongings that were brought into the hospital have been returned to patient. Escorted off the unit at 1835 accompanied by [facility] staff. Discharged to home via taxi.

## 2024-01-25 ENCOUNTER — PATIENT OUTREACH (OUTPATIENT)
Dept: CARE COORDINATION | Facility: CLINIC | Age: 34
End: 2024-01-25
Payer: COMMERCIAL

## 2024-01-25 NOTE — PROGRESS NOTES
Connected Care Resource Center:   [facility] Resource Center Contact  [facility]/Voicemail     Clinical Data: Post-Discharge Outreach     Outreach attempted x 2.  Left message on patient's voicemail, providing [facility]'s central phone number of [phone] ([phone]) for questions/concerns and/or to schedule an appt with an [facility] provider, if they do not have a PCP.      Plan:  [facility] will do no further outreaches at this time.       Glenys Matta RN  [facility] Care Resource [place], [facility]    *Connected Care Resource Team does NOT follow patient ongoing. Referrals are identified based on internal discharge reports and the outreach is to ensure patient has an understanding of their discharge instructions.

## 2024-04-29 ASSESSMENT — PATIENT HEALTH QUESTIONNAIRE - PHQ9
SUM OF ALL RESPONSES TO PHQ QUESTIONS 1-9: 27
10. IF YOU CHECKED OFF ANY PROBLEMS, HOW DIFFICULT HAVE THESE PROBLEMS MADE IT FOR YOU TO DO YOUR WORK, TAKE CARE OF THINGS AT HOME, OR GET ALONG WITH OTHER PEOPLE: EXTREMELY DIFFICULT
SUM OF ALL RESPONSES TO PHQ QUESTIONS 1-9: 27

## 2024-04-30 ENCOUNTER — OFFICE VISIT (OUTPATIENT)
Dept: FAMILY MEDICINE | Facility: CLINIC | Age: 34
End: 2024-04-30
Payer: COMMERCIAL

## 2024-04-30 VITALS
RESPIRATION RATE: 14 BRPM | BODY MASS INDEX: 30.9 KG/M2 | SYSTOLIC BLOOD PRESSURE: 102 MMHG | HEART RATE: 60 BPM | DIASTOLIC BLOOD PRESSURE: 67 MMHG | TEMPERATURE: 98.7 F | WEIGHT: 174.4 LBS | HEIGHT: 63 IN | OXYGEN SATURATION: 97 %

## 2024-04-30 DIAGNOSIS — F33.1 MAJOR DEPRESSIVE DISORDER, RECURRENT EPISODE, MODERATE (H): ICD-10-CM

## 2024-04-30 DIAGNOSIS — F43.10 POSTTRAUMATIC STRESS DISORDER: ICD-10-CM

## 2024-04-30 DIAGNOSIS — R45.851 SUICIDAL IDEATION: ICD-10-CM

## 2024-04-30 DIAGNOSIS — Z01.818 PREOP GENERAL PHYSICAL EXAM: Primary | ICD-10-CM

## 2024-04-30 LAB
ERYTHROCYTE [DISTWIDTH] IN BLOOD BY AUTOMATED COUNT: 13.9 % (ref 10–15)
HCT VFR BLD AUTO: 36.8 % (ref 35–47)
HGB BLD-MCNC: 12 G/DL (ref 11.7–15.7)
INR PPP: 1.03 (ref 0.85–1.15)
MCH RBC QN AUTO: 28.4 PG (ref 26.5–33)
MCHC RBC AUTO-ENTMCNC: 32.6 G/DL (ref 31.5–36.5)
MCV RBC AUTO: 87 FL (ref 78–100)
PLATELET # BLD AUTO: 187 10E3/UL (ref 150–450)
RBC # BLD AUTO: 4.22 10E6/UL (ref 3.8–5.2)
WBC # BLD AUTO: 8.1 10E3/UL (ref 4–11)

## 2024-04-30 PROCEDURE — 85027 COMPLETE CBC AUTOMATED: CPT | Performed by: STUDENT IN AN ORGANIZED HEALTH CARE EDUCATION/TRAINING PROGRAM

## 2024-04-30 PROCEDURE — 93000 ELECTROCARDIOGRAM COMPLETE: CPT | Mod: GC | Performed by: STUDENT IN AN ORGANIZED HEALTH CARE EDUCATION/TRAINING PROGRAM

## 2024-04-30 PROCEDURE — 36415 COLL VENOUS BLD VENIPUNCTURE: CPT | Performed by: STUDENT IN AN ORGANIZED HEALTH CARE EDUCATION/TRAINING PROGRAM

## 2024-04-30 PROCEDURE — 85610 PROTHROMBIN TIME: CPT | Performed by: STUDENT IN AN ORGANIZED HEALTH CARE EDUCATION/TRAINING PROGRAM

## 2024-04-30 PROCEDURE — 80048 BASIC METABOLIC PNL TOTAL CA: CPT | Performed by: STUDENT IN AN ORGANIZED HEALTH CARE EDUCATION/TRAINING PROGRAM

## 2024-04-30 PROCEDURE — 99215 OFFICE O/P EST HI 40 MIN: CPT | Mod: GC | Performed by: STUDENT IN AN ORGANIZED HEALTH CARE EDUCATION/TRAINING PROGRAM

## 2024-04-30 NOTE — PROGRESS NOTES
Preoperative Evaluation  [address] [address]  Phone: [phone]  Fax: [phone]  Primary Provider: Alexus Garcia  Pre-op Performing Provider: JAMES MONROY  Apr 30, 2024   {Provider  Link to PREOP SmartSet  Use this to apply standard patient instructions to AVS; includes medication directions, common orders, guidelines for anemia, warfarin, additional testing   :175154}    Glenys is a 33 year old, presenting for the following:  Pre-Op Exam (Pre op)        4/30/2024     4:12 PM   Additional Questions   Roomed by Iftin         4/30/2024    Information    services provided? No     Surgical Information  Surgery/Procedure: Disk replacement   Surgery Location: [facility]   Surgeon: Dr Whelan   Surgery Date: May 13  Time of Surgery:   Where patient plans to recover: At home with family  Fax number for surgical facility: [phone]     {2021 Provider Charting Preference for Preop :108839}    Subjective   {ALERT  Recent PHQ-9/EPDS score indicates suicidal ideations, document follow-up within the A/P section of the note :766772}{Provider Documentation  Link to C-SSRS ([facility]) Flowsheet :825983}    HPI related to upcoming procedure: ***        4/29/2024    10:04 PM   Preop Questions   1. Have you ever had a heart attack or stroke? No   2. Have you ever had surgery on your heart or blood vessels, such as a stent placement, a coronary artery bypass, or surgery on an artery in your head, neck, heart, or legs? No   3. Do you have chest pain with activity? No   4. Do you have a history of  heart failure? No   5. Do you currently have a cold, bronchitis or symptoms of other infection? No   6. Do you have a cough, shortness of breath, or wheezing? No   7. Do you or anyone in your family have previous history of blood clots? YES - ***   8. Do you or does anyone in your family have a serious bleeding  problem such as prolonged bleeding following surgeries or cuts? No   9. Have you ever had problems with anemia or been told to take iron pills? YES - ***   10. Have you had any abnormal blood loss such as black, tarry or bloody stools, or abnormal vaginal bleeding? No   11. Have you ever had a blood transfusion? No   12. Are you willing to have a blood transfusion if it is medically needed before, during, or after your surgery? Yes   13. Have you or any of your relatives ever had problems with anesthesia? No   14. Do you have sleep apnea, excessive snoring or daytime drowsiness? No   15. Do you have any artifical heart valves or other implanted medical devices like a pacemaker, defibrillator, or continuous glucose monitor? No   16. Do you have artificial joints? No   17. Are you allergic to latex? No   18. Is there any chance that you may be pregnant? No       Health Care Directive  Patient does not have a Health Care Directive or Living Will: {ADVANCE_DIRECTIVE_STATUS:275333}    Preoperative Review of   {Mnpmpreport:265960}  {Review MNPMP for all patients per [employer] MNPMP Profile:926567}    {Chronic problem details (Optional) :041372}    Patient Active Problem List    Diagnosis Date Noted    Generalized anxiety disorder with panic attacks 01/21/2024     Priority: Medium    Posttraumatic stress disorder 01/21/2024     Priority: Medium    Major depressive disorder, recurrent episode, moderate (H) 01/20/2024     Priority: Medium    Fall 07/20/2018     Priority: Medium      Past Medical History:   Diagnosis Date    Anxiety     Anxiety     Depression     Depressive disorder     treatment resistant depresion dx in the past 6 months    PONV (postoperative nausea and vomiting)     Nausea after last back injection    PTSD (post-traumatic stress disorder)      Past Surgical History:   Procedure Laterality Date    LUMBAR EPIDURAL INJECTION      HI INJ DX/THER AGNT PARAVERT FACET JOINT,IMG GUIDE,LUMBAR/SAC, 1ST LEVEL N/A  "9/28/2020    Procedure: ENDOSCOPIC RIGHT L5, MEDIAL, S1, S2, S3 LATERAL BRANCH NERVE TRANSECTION;  Surgeon: Ruben Thompson MD;  Location: [facility];  Service: Pain     Current Outpatient Medications   Medication Sig Dispense Refill    clonazePAM (KLONOPIN) 0.5 MG tablet Take 1 tablet (0.5 mg) by mouth 3 times daily      escitalopram (LEXAPRO) 20 MG tablet Take 1 tablet (20 mg) by mouth daily      gabapentin (NEURONTIN) 300 MG capsule Take 1 capsule (300 mg) by mouth 2 times daily (Morning and afternoon) 60 capsule 0    gabapentin (NEURONTIN) 400 MG capsule Take 1 capsule (400 mg) by mouth at bedtime 30 capsule 0    hydrOXYzine rita (VISTARIL) 50 MG capsule Take 1 capsule (50 mg) by mouth 3 times daily as needed for anxiety 20 capsule 0    propranolol (INDERAL) 10 MG tablet Take 1 tablet (10 mg) by mouth 2 times daily as needed (severe anxiety; use hydroxyzine first) 10 tablet 0    traZODone (DESYREL) 50 MG tablet Take 1 tablet (50 mg) by mouth nightly as needed for sleep 10 tablet 0       No Known Allergies     Social History     Tobacco Use    Smoking status: Never    Smokeless tobacco: Never   Substance Use Topics    Alcohol use: Yes     Alcohol/week: 6.0 standard drinks of alcohol     Types: 6 Cans of beer per week     Comment: socially, rarely     {FAMILY HISTORY (Optional):772932234}  History   Drug Use    Types: Marijuana         Review of Systems  {ROS Picklists (Optional):721558}    Objective    /67 (BP Location: Left arm, Patient Position: Sitting, Cuff Size: Adult Regular)   Pulse 60   Temp 98.7  F (37.1  C)   Resp 14   Ht 1.6 m (5' 3\")   Wt 79.1 kg (174 lb 6.4 oz)   LMP 04/29/2024 (Exact Date)   SpO2 97%   BMI 30.89 kg/m     Estimated body mass index is 30.89 kg/m  as calculated from the following:    Height as of this encounter: 1.6 m (5' 3\").    Weight as of this encounter: 79.1 kg (174 lb 6.4 oz).  Physical Exam  {Exam List (Optional):089181}    Recent Labs   Lab Test " 24  1935   HGB 12.0         POTASSIUM 4.1   CR 0.90        Diagnostics  {LABS:347287}   {EK}    Revised Cardiac Risk Index (RCRI)  The patient has the following serious cardiovascular risks for perioperative complications:  {PREOP REVISED CARDIAC RISK INDEX (RCRI) :744402}     RCRI Interpretation: {REVISED CARDIAC RISK INTERPRETATION :100785}         Signed Electronically by: Shahana Green DO  Copy of this evaluation report is provided to requesting physician.    {Provider Resources  Preop [facility] Preop Guidelines  Revised Cardiac Risk Index :312337}   {Email feedback regarding this note to [email or web address]   :020667}  Answers submitted by the patient for this visit:  Patient Health Questionnaire (Submitted on 2024)  If you checked off any problems, how difficult have these problems made it for you to do your work, take care of things at home, or get along with other people?: Extremely difficult  PHQ9 TOTAL SCORE: 27

## 2024-04-30 NOTE — PATIENT INSTRUCTIONS
Preparing for Your Surgery  Getting started  A nurse will call you to review your health history and instructions. They will give you an arrival time based on your scheduled surgery time. Please be ready to share:  Your doctor's clinic name and phone number  Your medical, surgical, and anesthesia history  A list of allergies and sensitivities  A list of medicines, including herbal treatments and over-the-counter drugs  Whether the patient has a legal guardian (ask how to send us the papers in advance)  Please tell us if you're pregnant--or if there's any chance you might be pregnant. Some surgeries may injure a fetus (unborn baby), so they require a pregnancy test. Surgeries that are safe for a fetus don't always need a test, and you can choose whether to have one.   If you have a child who's having surgery, please ask for a copy of Preparing for Your Child's Surgery.    Preparing for surgery  Within 10 to 30 days of surgery: Have a pre-op exam (sometimes called an H&P, or History and Physical). This can be done at a clinic or pre-operative center.  If you're having a , you may not need this exam. Talk to your care team.  At your pre-op exam, talk to your care team about all medicines you take. If you need to stop any medicines before surgery, ask when to start taking them again.  We do this for your safety. Many medicines can make you bleed too much during surgery. Some change how well surgery (anesthesia) drugs work.  Call your insurance company to let them know you're having surgery. (If you don't have insurance, call [phone].)  Call your clinic if there's any change in your health. This includes signs of a cold or flu (sore throat, runny nose, cough, rash, fever). It also includes a scrape or scratch near the surgery site.  If you have questions on the day of surgery, call your hospital or surgery center.  Eating and drinking guidelines  For your safety: Unless your surgeon tells you otherwise,  follow the guidelines below.  Eat and drink as usual until 8 hours before you arrive for surgery. After that, no food or milk.  Drink clear liquids until 2 hours before you arrive. These are liquids you can see through, like water, Gatorade, and Propel Water. They also include plain black coffee and tea (no cream or milk), candy, and breath mints. You can spit out gum when you arrive.  If you drink alcohol: Stop drinking it the night before surgery.  If your care team tells you to take medicine on the morning of surgery, it's okay to take it with a sip of water.  Preventing infection  Shower or bathe the night before and morning of your surgery. Follow the instructions your clinic gave you. (If no instructions, use regular soap.)  Don't shave or clip hair near your surgery site. We'll remove the hair if needed.  Don't smoke or vape the morning of surgery. You may chew nicotine gum up to 2 hours before surgery. A nicotine patch is okay.  Note: Some surgeries require you to completely quit smoking and nicotine. Check with your surgeon.  Your care team will make every effort to keep you safe from infection. We will:  Clean our hands often with soap and water (or an alcohol-based hand rub).  Clean the skin at your surgery site with a special soap that kills germs.  Give you a special gown to keep you warm. (Cold raises the risk of infection.)  Wear special hair covers, masks, gowns and gloves during surgery.  Give antibiotic medicine, if prescribed. Not all surgeries need antibiotics.  What to bring on the day of surgery  Photo ID and insurance card  Copy of your health care directive, if you have one  Glasses and hearing aids (bring cases)  You can't wear contacts during surgery  Inhaler and eye drops, if you use them (tell us about these when you arrive)  CPAP machine or breathing device, if you use them  A few personal items, if spending the night  If you have . . .  A pacemaker, ICD (cardiac defibrillator) or other  implant: Bring the ID card.  An implanted stimulator: Bring the remote control.  A legal guardian: Bring a copy of the certified (court-stamped) guardianship papers.  Please remove any jewelry, including body piercings. Leave jewelry and other valuables at home.  If you're going home the day of surgery  You must have a responsible adult drive you home. They should stay with you overnight as well.  If you don't have someone to stay with you, and you aren't safe to go home alone, we may keep you overnight. Insurance often won't pay for this.  After surgery  If it's hard to control your pain or you need more pain medicine, please call your surgeon's office.  Questions?   If you have any questions for your care team, list them here: _________________________________________________________________________________________________________________________________________________________________________ ____________________________________ ____________________________________ ____________________________________  For informational purposes only. Not to replace the advice of your health care provider. Copyright   2003, 2019 [employer]. All rights reserved. Clinically reviewed by Lisa Jasso MD. [employer] 332447 - REV 12/22.    How to Take Your Medication Before Surgery  OK to take all of your medications the night before.   The morning of surgery: OK to continue all scheduled medications, except hold clonazepam until after surgery.   PRN: Hydroxyzine HOLD day of surgery, ok to take trazodone and propranolol if needed.         Crisis Numbers     [facility] - [phone] ([facility] Mobile Response Team)    [facility] [phone] (Emergency Psychiatric Evaluation)     [facility] Multilingual Crisis Line:  [phone]    [place] [facility] [place] and crisis [place]  [address] [place], [place]  [phone]    Crisis Text Line: Text [place] to 337 Free support at your  fingertips 24/7  People who text [place] to 988 will be connected with a counselor. Crisis Text Line is available 24 hours a day, seven days a week.    Or call 288

## 2024-04-30 NOTE — PROGRESS NOTES
Preoperative Evaluation  [address]  [address] [place] [id number]  Phone: [phone]  Fax: [phone]  Primary Provider: Alexus Garcia  Pre-op Performing Provider: JAMES MONROY  Apr 30, 2024       Glenys is a 33 year old, presenting for the following:  No chief complaint on file.        4/30/2024     4:12 PM   Additional Questions   Roomed by Shanein         4/30/2024    Information    services provided? No     Surgical Information  Surgery/Procedure: C5-C6 total disc replacement   Surgery Location: [facility]   Surgeon: Dr. Rashad Whelan  Surgery Date: 5/13/24  Time of Surgery: No time yet  Where patient plans to recover: At home with family with partner who works from home.   Fax number for surgical facility: [phone] & [phone]    Assessment & Plan     The proposed surgical procedure is considered INTERMEDIATE risk.    Preop general physical exam  Clearance given, see remainder of note below. Labs WNL, EKG normal sinus.    Major depressive disorder, recurrent episode, moderate (H)  Posttraumatic stress disorder  Suicidal ideation  PHQ9 score of 27 here, most symptoms chronic. Suicidal ideation chronic. Mental health referral placed (wanting to find a new provider), safe supports identified. Patient able to express what they would assure their safety in a situation where their suicidal ideation became active. Crisis resources given in AVS, asked patient to follow up following their surgery to see if medication adjustments need to be made.       Depression Screening Follow Up      Follow Up Actions Taken  Patient to follow up with PCP.  Clinic staff to schedule appointment if able.  Mental Health Referral placed    Discussed the following ways the patient can remain in a safe environment:  remove alcohol, remove drugs, and dispose of old medications     Need new mental health provider  - passive suicidal ideation  chronically  - not active   - has social supports, partner, EMPATH  -  no access to firearms  - knows not to drink, use substances while having suicidal ideaton     - No identified additional risk factors other than previously addressed    Antiplatelet or Anticoagulation Medication Instructions   - Patient is on no antiplatelet or anticoagulation medications.    Additional Medication Instructions  Patient is to take all scheduled medications on the day of surgery EXCEPT for modifications listed below:    Hydroxyzine and clonazepam.     Recommendation  APPROVAL GIVEN to proceed with proposed procedure. Labs normal, EKG normal sinus.       Subjective       HPI related to upcoming procedure:     Balance is bad  - brisk reflexes  - bilateral tingling in both arms  - C5-C6 total disc replacement  - has had back surgery before, went well   - had several spine/neuro surgeons tell them to get this surgery            4/29/2024    10:04 PM   Preop Questions   1. Have you ever had a heart attack or stroke? No   2. Have you ever had surgery on your heart or blood vessels, such as a stent placement, a coronary artery bypass, or surgery on an artery in your head, neck, heart, or legs? No   3. Do you have chest pain with activity? No   4. Do you have a history of  heart failure? No   5. Do you currently have a cold, bronchitis or symptoms of other infection? No   6. Do you have a cough, shortness of breath, or wheezing? No   7. Do you or anyone in your family have previous history of blood clots? YES - Dad completed suicide when pt 14, autopsy revealed heart disease with clogged arteries.    8. Do you or does anyone in your family have a serious bleeding problem such as prolonged bleeding following surgeries or cuts? No   9. Have you ever had problems with anemia or been told to take iron pills? YES - Only when on period   10. Have you had any abnormal blood loss such as black, tarry or bloody stools, or abnormal vaginal bleeding?  No   11. Have you ever had a blood transfusion? No   12. Are you willing to have a blood transfusion if it is medically needed before, during, or after your surgery? Yes   13. Have you or any of your relatives ever had problems with anesthesia? No   14. Do you have sleep apnea, excessive snoring or daytime drowsiness? No   15. Do you have any artifical heart valves or other implanted medical devices like a pacemaker, defibrillator, or continuous glucose monitor? No   16. Do you have artificial joints? No   17. Are you allergic to latex? No   18. Is there any chance that you may be pregnant? No       Health Care Directive  Patient does not have a Health Care Directive or Living Will: Discussed advance care planning with patient; however, patient declined at this time.    Preoperative Review of    reviewed - controlled substances reflected in medication list.          Patient Active Problem List    Diagnosis Date Noted    Generalized anxiety disorder with panic attacks 01/21/2024     Priority: Medium    Posttraumatic stress disorder 01/21/2024     Priority: Medium    Major depressive disorder, recurrent episode, moderate (H) 01/20/2024     Priority: Medium    Fall 07/20/2018     Priority: Medium      Past Medical History:   Diagnosis Date    Anxiety     Anxiety     Depression     Depressive disorder     treatment resistant depresion dx in the past 6 months    PONV (postoperative nausea and vomiting)     Nausea after last back injection    PTSD (post-traumatic stress disorder)      Past Surgical History:   Procedure Laterality Date    LUMBAR EPIDURAL INJECTION      SD INJ DX/THER AGNT PARAVERT FACET JOINT,IMG GUIDE,LUMBAR/SAC, 1ST LEVEL N/A 9/28/2020    Procedure: ENDOSCOPIC RIGHT L5, MEDIAL, S1, S2, S3 LATERAL BRANCH NERVE TRANSECTION;  Surgeon: Ruben Thompson MD;  [facility]: [facility];  Service: Pain     Current Outpatient Medications   Medication Sig Dispense Refill    clonazePAM (KLONOPIN) 0.5 MG  "tablet Take 1 tablet (0.5 mg) by mouth 3 times daily      escitalopram (LEXAPRO) 20 MG tablet Take 1 tablet (20 mg) by mouth daily      gabapentin (NEURONTIN) 300 MG capsule Take 1 capsule (300 mg) by mouth 2 times daily (Morning and afternoon) 60 capsule 0    gabapentin (NEURONTIN) 400 MG capsule Take 1 capsule (400 mg) by mouth at bedtime 30 capsule 0    hydrOXYzine rita (VISTARIL) 50 MG capsule Take 1 capsule (50 mg) by mouth 3 times daily as needed for anxiety 20 capsule 0    propranolol (INDERAL) 10 MG tablet Take 1 tablet (10 mg) by mouth 2 times daily as needed (severe anxiety; use hydroxyzine first) 10 tablet 0    traZODone (DESYREL) 50 MG tablet Take 1 tablet (50 mg) by mouth nightly as needed for sleep 10 tablet 0       No Known Allergies     Social History     Tobacco Use    Smoking status: Never    Smokeless tobacco: Never   Substance Use Topics    Alcohol use: Yes     Alcohol/week: 6.0 standard drinks of alcohol     Types: 6 Cans of beer per week     Comment: socially, rarely       History   Drug Use    Types: Marijuana           Objective    There were no vitals taken for this visit.   Estimated body mass index is 29.18 kg/m  as calculated from the following:    Height as of 1/21/24: 1.6 m (5' 3\").    Weight as of 1/21/24: 74.7 kg (164 lb 11.2 oz).  Physical Exam  GENERAL: alert and no distress  EYES: Eyes grossly normal to inspection, PERRL and conjunctivae and sclerae normal  NECK: no adenopathy, no asymmetry, masses, or scars  RESP: lungs clear to auscultation - no rales, rhonchi or wheezes  CV: regular rate and rhythm, normal S1 S2, no S3 or S4, no murmur, click or rub, no peripheral edema  ABDOMEN: soft, nontender, no hepatosplenomegaly, no masses and bowel sounds normal  MS: no gross musculoskeletal defects noted, no edema  SKIN: no suspicious lesions or rashes  NEURO: Normal strength and tone, mentation intact and speech normal  PSYCH: mentation appears normal, affect normal/bright    Recent Labs "   Lab Test 01/20/24  1935   HGB 12.0         POTASSIUM 4.1   CR 0.90      PHQ-9 score:        4/29/2024    10:02 PM   PHQ   PHQ-9 Total Score 27   Q9: Thoughts of better off dead/self-harm past 2 weeks Nearly every day   F/U: Thoughts of suicide or self-harm Yes   F/U: Self harm-plan No   F/U: Self-harm action No   F/U: Safety concerns No                 Diagnostics  Labs pending at this time.  Results will be reviewed when available.   EKG required for surgeon request and not completed in the last 90 days.     Revised Cardiac Risk Index (RCRI)  The patient has the following serious cardiovascular risks for perioperative complications:   - No serious cardiac risks = 0 points        Signed Electronically by: Shahana Green DO  Copy of this evaluation report is provided to requesting physician.         Answers submitted by the patient for this visit:  Patient Health Questionnaire (Submitted on 4/29/2024)  If you checked off any problems, how difficult have these problems made it for you to do your work, take care of things at home, or get along with other people?: Extremely difficult  PHQ9 TOTAL SCORE: 27

## 2024-05-01 LAB
ANION GAP SERPL CALCULATED.3IONS-SCNC: 10 MMOL/L (ref 7–15)
BUN SERPL-MCNC: 16.4 MG/DL (ref 6–20)
CALCIUM SERPL-MCNC: 9.1 MG/DL (ref 8.6–10)
CHLORIDE SERPL-SCNC: 104 MMOL/L (ref 98–107)
CREAT SERPL-MCNC: 0.99 MG/DL (ref 0.51–1.17)
DEPRECATED HCO3 PLAS-SCNC: 25 MMOL/L (ref 22–29)
EGFRCR SERPLBLD CKD-EPI 2021: 77 ML/MIN/1.73M2
GLUCOSE SERPL-MCNC: 84 MG/DL (ref 70–99)
POTASSIUM SERPL-SCNC: 4 MMOL/L (ref 3.4–5.3)
SODIUM SERPL-SCNC: 139 MMOL/L (ref 135–145)

## 2024-05-02 ENCOUNTER — TELEPHONE (OUTPATIENT)
Dept: FAMILY MEDICINE | Facility: CLINIC | Age: 34
End: 2024-05-02
Payer: COMMERCIAL

## 2024-05-02 NOTE — TELEPHONE ENCOUNTER
----- Message from Shahana Green DO sent at 5/2/2024  5:54 AM CDT -----  Could we fax these lab results to the following #s? It's for surgery clearance.     [phone] & [phone]    THANK YOU! :)

## 2024-07-13 ENCOUNTER — HEALTH MAINTENANCE LETTER (OUTPATIENT)
Age: 34
End: 2024-07-13

## 2024-10-01 ENCOUNTER — PATIENT OUTREACH (OUTPATIENT)
Dept: FAMILY MEDICINE | Facility: CLINIC | Age: 34
End: 2024-10-01
Payer: COMMERCIAL

## 2024-10-01 NOTE — TELEPHONE ENCOUNTER
Patient Quality Outreach    Patient is due for the following:   Cervical Cancer Screening - PAP Needed    Next Steps:   Schedule a office visit for cervical cancer screening Adult Preventative    Type of outreach:    Sent [facility] message.    Next Steps:  Reach out within 90 days via [facility].    Max number of attempts reached: No. Will try again in 90 days if patient still on fail list.    Questions for provider review:    None           Dayana Rodriguez RN

## 2024-11-27 ENCOUNTER — OFFICE VISIT (OUTPATIENT)
Dept: FAMILY MEDICINE | Facility: CLINIC | Age: 34
End: 2024-11-27
Payer: COMMERCIAL

## 2024-11-27 VITALS
RESPIRATION RATE: 12 BRPM | TEMPERATURE: 98.7 F | HEART RATE: 81 BPM | DIASTOLIC BLOOD PRESSURE: 66 MMHG | SYSTOLIC BLOOD PRESSURE: 100 MMHG | BODY MASS INDEX: 34.09 KG/M2 | WEIGHT: 192.4 LBS | HEIGHT: 63 IN | OXYGEN SATURATION: 100 %

## 2024-11-27 DIAGNOSIS — Z00.00 ROUTINE GENERAL MEDICAL EXAMINATION AT A HEALTH CARE FACILITY: Primary | ICD-10-CM

## 2024-11-27 DIAGNOSIS — M54.41 CHRONIC RIGHT-SIDED LOW BACK PAIN WITH RIGHT-SIDED SCIATICA: ICD-10-CM

## 2024-11-27 DIAGNOSIS — R68.89 CHANGE IN WEIGHT: ICD-10-CM

## 2024-11-27 DIAGNOSIS — Z13.9 ENCOUNTER FOR SCREENING INVOLVING SOCIAL DETERMINANTS OF HEALTH (SDOH): ICD-10-CM

## 2024-11-27 DIAGNOSIS — N92.0 MENORRHAGIA WITH REGULAR CYCLE: ICD-10-CM

## 2024-11-27 DIAGNOSIS — Z98.890 S/P CERVICAL DISC REPLACEMENT: ICD-10-CM

## 2024-11-27 DIAGNOSIS — F33.1 MAJOR DEPRESSIVE DISORDER, RECURRENT EPISODE, MODERATE (H): ICD-10-CM

## 2024-11-27 DIAGNOSIS — G89.29 CHRONIC RIGHT-SIDED LOW BACK PAIN WITH RIGHT-SIDED SCIATICA: ICD-10-CM

## 2024-11-27 PROCEDURE — 90471 IMMUNIZATION ADMIN: CPT

## 2024-11-27 PROCEDURE — 90715 TDAP VACCINE 7 YRS/> IM: CPT

## 2024-11-27 PROCEDURE — 99395 PREV VISIT EST AGE 18-39: CPT | Mod: 25

## 2024-11-27 PROCEDURE — 99214 OFFICE O/P EST MOD 30 MIN: CPT | Mod: 25

## 2024-11-27 SDOH — HEALTH STABILITY: PHYSICAL HEALTH: ON AVERAGE, HOW MANY MINUTES DO YOU ENGAGE IN EXERCISE AT THIS LEVEL?: 30 MIN

## 2024-11-27 SDOH — HEALTH STABILITY: PHYSICAL HEALTH: ON AVERAGE, HOW MANY DAYS PER WEEK DO YOU ENGAGE IN MODERATE TO STRENUOUS EXERCISE (LIKE A BRISK WALK)?: 5 DAYS

## 2024-11-27 ASSESSMENT — PATIENT HEALTH QUESTIONNAIRE - PHQ9
SUM OF ALL RESPONSES TO PHQ QUESTIONS 1-9: 20
10. IF YOU CHECKED OFF ANY PROBLEMS, HOW DIFFICULT HAVE THESE PROBLEMS MADE IT FOR YOU TO DO YOUR WORK, TAKE CARE OF THINGS AT HOME, OR GET ALONG WITH OTHER PEOPLE: EXTREMELY DIFFICULT
SUM OF ALL RESPONSES TO PHQ QUESTIONS 1-9: 20

## 2024-11-27 ASSESSMENT — SOCIAL DETERMINANTS OF HEALTH (SDOH): HOW OFTEN DO YOU GET TOGETHER WITH FRIENDS OR RELATIVES?: NEVER

## 2024-11-27 NOTE — PATIENT INSTRUCTIONS
Patient Education   Here is the plan from today's visit      Please call or return to clinic if your symptoms don't go away.    Follow up plan  Return in about 53 weeks (around 12/3/2025) for Annual Wellness Visit.    Thank you for coming to [facility] today.  Lab Testing:  **If you had lab testing today and your results are reassuring or normal they will be mailed to you or sent through [employer] within 7 days.   **If the lab tests need quick action we will call you with the results.  **If you are having labs done on a different day, please call [phone] to schedule at [facility] or [phone] for other [facility] locations. Labs do not offer walk-in appointments.  The phone number we will call with results is # [phone] (home) . If this is not the best number please call our clinic and change the number.  Medication Refills:  If you need any refills please call your pharmacy and they will contact us.   If you need to  your refill at a new pharmacy, please contact the new pharmacy directly. The new pharmacy will help you get your medications transferred faster.   Scheduling:  If you have any concerns about today's visit or wish to schedule another appointment please call our office during normal business hours [phone] (8-5:00 M-F). If you can no longer make a scheduled visit, please cancel via [employer] or call us to cancel.   If a referral was made to an [facility] specialty provider and you do not get a call from central scheduling, please refer to directions on your visit summary or call our office during normal business hours for assistance.   If a Mammogram was ordered for you at the [facility] call [phone] to schedule or change your appointment.  If you had an XRay/CT/Ultrasound/MRI ordered the number is [phone] to schedule or change your radiology appointment.   [facility] has limited ultrasound appointments available on Wednesdays, if you  would like your ultrasound at [facility], please call [phone] to schedule.   Medical Concerns:  If you have urgent medical concerns please call [phone] at any time of the day.    Damian Ortiz MD       Patient Education   Preventive Care Advice   This is general advice given by our system to help you stay healthy. However, your care team may have specific advice just for you. Please talk to your care team about your preventive care needs.  Nutrition  Eat 5 or more servings of fruits and vegetables each day.  Try wheat bread, brown rice and whole grain pasta (instead of white bread, rice, and pasta).  Get enough calcium and vitamin D. Check the label on foods and aim for 100% of the RDA (recommended daily allowance).  Lifestyle  Exercise at least 150 minutes each week  (30 minutes a day, 5 days a week).  Do muscle strengthening activities 2 days a week. These help control your weight and prevent disease.  No smoking.  Wear sunscreen to prevent skin cancer.  Have a dental exam and cleaning every 6 months.  Yearly exams  See your health care team every year to talk about:  Any changes in your health.  Any medicines your care team has prescribed.  Preventive care, family planning, and ways to prevent chronic diseases.  Shots (vaccines)   HPV shots (up to age 26), if you've never had them before.  Hepatitis B shots (up to age 59), if you've never had them before.  COVID-19 shot: Get this shot when it's due.  Flu shot: Get a flu shot every year.  Tetanus shot: Get a tetanus shot every 10 years.  Pneumococcal, hepatitis A, and RSV shots: Ask your care team if you need these based on your risk.  Shingles shot (for age 50 and up)  General health tests  Diabetes screening:  Starting at age 35, Get screened for diabetes at least every 3 years.  If you are younger than age 35, ask your care team if you should be screened for diabetes.  Cholesterol test: At age 39, start having a cholesterol test every 5 years, or  more often if advised.  Bone density scan (DEXA): At age 50, ask your care team if you should have this scan for osteoporosis (brittle bones).  Hepatitis C: Get tested at least once in your life.  STIs (sexually transmitted infections)  Before age 24: Ask your care team if you should be screened for STIs.  After age 24: Get screened for STIs if you're at risk. You are at risk for STIs (including HIV) if:  You are sexually active with more than one person.  You don't use condoms every time.  You or a partner was diagnosed with a sexually transmitted infection.  If you are at risk for HIV, ask about PrEP medicine to prevent HIV.  Get tested for HIV at least once in your life, whether you are at risk for HIV or not.  Cancer screening tests  Cervical cancer screening: If you have a cervix, begin getting regular cervical cancer screening tests starting at age 21.  Breast cancer scan (mammogram): If you've ever had breasts, begin having regular mammograms starting at age 40. This is a scan to check for breast cancer.  Colon cancer screening: It is important to start screening for colon cancer at age 45.  Have a colonoscopy test every 10 years (or more often if you're at risk) Or, ask your provider about stool tests like a FIT test every year or Cologuard test every 3 years.  To learn more about your testing options, visit:   .  For help making a decision, visit:   https://bit.ly/bl57613.  Prostate cancer screening test: If you have a prostate, ask your care team if a prostate cancer screening test (PSA) at age 55 is right for you.  Lung cancer screening: If you are a current or former smoker ages 50 to 80, ask your care team if ongoing lung cancer screenings are right for you.  For informational purposes only. Not to replace the advice of your health care provider. Copyright   2023 [employer]. All rights reserved. Clinically reviewed by the [employer] [facility]. [employer] 158965 - REV  01/24.  Learning About Stress  What is stress?     Stress is your body's response to a hard situation. Your body can have a physical, emotional, or mental response. Stress is a fact of life for most people, and it affects everyone differently. What causes stress for you may not be stressful for someone else.  A lot of things can cause stress. You may feel stress when you go on a job interview, take a test, or run a race. This kind of short-term stress is normal and even useful. It can help you if you need to work hard or react quickly. For example, stress can help you finish an important job on time.  Long-term stress is caused by ongoing stressful situations or events. Examples of long-term stress include long-term health problems, ongoing problems at work, or conflicts in your family. Long-term stress can harm your health.  How does stress affect your health?  When you are stressed, your body responds as though you are in danger. It makes hormones that speed up your heart, make you breathe faster, and give you a burst of energy. This is called the fight-or-flight stress response. If the stress is over quickly, your body goes back to normal and no harm is done.  But if stress happens too often or lasts too long, it can have bad effects. Long-term stress can make you more likely to get sick, and it can make symptoms of some diseases worse. If you tense up when you are stressed, you may develop neck, shoulder, or low back pain. Stress is linked to high blood pressure and heart disease.  Stress also harms your emotional health. It can make you yates, tense, or depressed. Your relationships may suffer, and you may not do well at work or school.  What can you do to manage stress?  You can try these things to help manage stress:   Do something active. Exercise or activity can help reduce stress. Walking is a great way to get started. Even everyday activities such as housecleaning or yard work can help.  Try yoga or mariann  chi. These techniques combine exercise and meditation. You may need some training at first to learn them.  Do something you enjoy. For example, listen to music or go to a movie. Practice your hobby or do volunteer work.  Meditate. This can help you relax, because you are not worrying about what happened before or what may happen in the future.  Do guided imagery. Imagine yourself in any setting that helps you feel calm. You can use online videos, books, or a teacher to guide you.  Do breathing exercises. For example:  From a standing position, bend forward from the waist with your knees slightly bent. Let your arms dangle close to the floor.  Breathe in slowly and deeply as you return to a standing position. Roll up slowly and lift your head last.  Hold your breath for just a few seconds in the standing position.  Breathe out slowly and bend forward from the waist.  Let your feelings out. Talk, laugh, cry, and express anger when you need to. Talking with supportive friends or family, a counselor, or a maciel leader about your feelings is a healthy way to relieve stress. Avoid discussing your feelings with people who make you feel worse.  Write. It may help to write about things that are bothering you. This helps you find out how much stress you feel and what is causing it. When you know this, you can find better ways to cope.  What can you do to prevent stress?  You might try some of these things to help prevent stress:  Manage your time. This helps you find time to do the things you want and need to do.  Get enough sleep. Your body recovers from the stresses of the day while you are sleeping.  Get support. Your family, friends, and community can make a difference in how you experience stress.  Limit your news feed. Avoid or limit time on social media or news that may make you feel stressed.  Do something active. Exercise or activity can help reduce stress. Walking is a great way to get started.  Where can you learn  "more?  Go to https://www.Fancloud.net/patiented  Enter N032 in the search box to learn more about \"Learning About Stress.\"  Current as of: October 24, 2023  Content Version: 14.2 2024 [email or web address].   Care instructions adapted under license by your healthcare professional. If you have questions about a medical condition or this instruction, always ask your healthcare professional. Healthwise, Incorporated disclaims any warranty or liability for your use of this information.    Learning About Depression Screening  What is depression screening?  Depression screening is a way to see if you have depression symptoms. It may be done by a doctor or counselor. It's often part of a routine checkup. That's because your mental health is just as important as your physical health.  Depression is a mental health condition that affects how you feel, think, and act. You may:  Have less energy.  Lose interest in your daily activities.  Feel sad and grouchy for a long time.  Depression is very common. It affects people of all ages.  Many things can lead to depression. Some people become depressed after they have a stroke or find out they have a major illness like cancer or heart disease. The death of a loved one or a breakup may lead to depression. It can run in families. Most experts believe that a combination of inherited genes and stressful life events can cause it.  What happens during screening?  You may be asked to fill out a form about your depression symptoms. You and the doctor will discuss your answers. The doctor may ask you more questions to learn more about how you think, act, and feel.  What happens after screening?  If you have symptoms of depression, your doctor will talk to you about your options.  Doctors usually treat depression with medicines or counseling. Often, combining the two works best. Many people don't get help because they think that they'll get over the depression on their own. But people " "with depression may not get better unless they get treatment.  The cause of depression is not well understood. There may be many factors involved. But if you have depression, it's not your fault.  A serious symptom of depression is thinking about death or suicide. If you or someone you care about talks about this or about feeling hopeless, get help right away.  It's important to know that depression can be treated. Medicine, counseling, and self-care may help.  Where can you learn more?  Go to https://www.[employer].net/patiented  Enter T185 in the search box to learn more about \"Learning About Depression Screening.\"  Current as of: June 24, 2023  Content Version: 14.2 2024 [employer].   Care instructions adapted under license by your healthcare professional. If you have questions about a medical condition or this instruction, always ask your healthcare professional. Healthwise, Incorporated disclaims any warranty or liability for your use of this information.    Substance Use Disorder: Care Instructions  Overview     You can improve your life and health by stopping your use of alcohol or drugs. When you don't drink or use drugs, you may feel and sleep better. You may get along better with your family, friends, and coworkers. There are medicines and programs that can help with substance use disorder.  How can you care for yourself at home?  Here are some ways to help you stay sober and prevent relapse.  If you have been given medicine to help keep you sober or reduce your cravings, be sure to take it exactly as prescribed.  Talk to your doctor about programs that can help you stop using drugs or drinking alcohol.  Do not keep alcohol or drugs in your home.  Plan ahead. Think about what you'll say if other people ask you to drink or use drugs. Try not to spend time with people who drink or use drugs.  Use the time and money spent on drinking or drugs to do something that's important to you.  Preventing " a relapse  Have a plan to deal with relapse. Learn to recognize changes in your thinking that lead you to drink or use drugs. Get help before you start to drink or use drugs again.  Try to stay away from situations, friends, or places that may lead you to drink or use drugs.  If you feel the need to drink alcohol or use drugs again, seek help right away. Call a trusted friend or family member. Some people get support from organizations such as Narcotics Anonymous or [employer] or from treatment facilities.  If you relapse, get help as soon as you can. Some people make a plan with another person that outlines what they want that person to do for them if they relapse. The plan usually includes how to handle the relapse and who to notify in case of relapse.  Don't give up. Remember that a relapse doesn't mean that you have failed. Use the experience to learn the triggers that lead you to drink or use drugs. Then quit again. Recovery is a lifelong process. Many people have several relapses before they are able to quit for good.  Follow-up care is a key part of your treatment and safety. Be sure to make and go to all appointments, and call your doctor if you are having problems. It's also a good idea to know your test results and keep a list of the medicines you take.  When should you call for help?   Call 911  anytime you think you may need emergency care. For example, call if you or someone else:    Has overdosed or has withdrawal signs. Be sure to tell the emergency workers that you are or someone else is using or trying to quit using drugs. Overdose or withdrawal signs may include:  Losing consciousness.  Seizure.  Seeing or hearing things that aren't there (hallucinations).     Is thinking or talking about suicide or harming others.   Where to get help 24 hours a day, 7 days a week   If you or someone you know talks about suicide, self-harm, a mental health crisis, a substance use crisis, or any other kind of  "emotional distress, get help right away. You can:    Call the Suicide and Crisis Lifeline at 988.     Call 4-079-111-RAGT (1-626.499.4806).     Text HOME to 905370 to access the Crisis Text Line.   Consider saving these numbers in your phone.  Go to [employer] for more information or to chat online.  Call your doctor now or seek immediate medical care if:    You are having withdrawal symptoms. These may include nausea or vomiting, sweating, shakiness, and anxiety.   Watch closely for changes in your health, and be sure to contact your doctor if:    You have a relapse.     You need more help or support to stop.   Where can you learn more?  Go to https://www.[employer]stream.net/patiented  Enter H573 in the search box to learn more about \"Substance Use Disorder: Care Instructions.\"  Current as of: November 15, 2023  Content Version: 14.2 2024 [employer].   Care instructions adapted under license by your healthcare professional. If you have questions about a medical condition or this instruction, always ask your healthcare professional. Healthwise, Incorporated disclaims any warranty or liability for your use of this information.       "

## 2024-11-27 NOTE — Clinical Note
Nice job with complex visit.  I added a split bill due to your addressing more than preventative care. Let me know if questions!  kp

## 2024-11-27 NOTE — PROGRESS NOTES
"Preventive Care Visit  [facility] AMANDA Ortiz MD, Family Medicine  Nov 27, 2024    Assessment & Plan     Routine general medical examination at a health care facility  Reports doing \"okay\" overall. Would like to continue addressing different health concerns during future visits. Experienced a setback in terms of health after a fall at work (see below)  Ordered lipid panel as part of routine health maintenance.   Of note, all labs ordered during this visit will be completed at a later date, as the [facility] lab was closed by the time the visit concluded. The patient agreed to have the labs drawn at an [facility] lab in the near future.  - Lipid panel    Chronic back issues  S/p radiofrequency ablation for right sided sciatica, 2021  S/p C5-C6 total disc replacement, May 2024  Has history of chronic back issues stemming from an injury 6 years ago. Had radiofrequency ablation for right sided sciatica several years ago. Had a fall from ladder at work that led to back pain and required a C5-C6 total disc replacement done in May 2024.   Last week, had sudden recurrence of right sided sciatic pain after going from a standing to sitting position. Went to the ED where workup was WNL. No new trauma. Back pain slowly improving. MRI appt coming up at [facility] [place]. Follows up w/ orthopedic spine surgeon.     MDD  Patient reports mood as \"okay\" and is actively engaged in mental health care. Attends virtual therapy sessions twice weekly and follows up with a provider at [facility] for medication management. Currently on Lexapro 20 mg daily.  During this visit, the patient denies SI and HI, or self-injurious behaviors. PHQ-9 score was 20, with a positive response to question 9. Upon discussion, the patient reassured that they are not experiencing SI, HI, or engaging in SIB.  Patient expressed feeling adequately supported and is aware of crisis resources in the area. They declined additional " resources or a crisis list during this visit. I let them know that BEH services are available at [facility] as well.   Will continue to monitor mood and mental health status during future visits.    Encounter for screening involving social determinants of health (SDoH)  Patient expressed housing-related questions/concerns. Says they are not urgent but would like access to SW at [facility] to know what their options are.   CC referral placed. I will also message SW, so they can see the patient during their next appointment.   - Primary Care - Care Coordination Referral    Change in weight  Noted potential concerns related to restrictive eating and stress-driven dietary habits. Also, recent surgery. All of which resulting in fluctuation in weight for the last year or so.   - TSH with free T4 reflex - to assess thyroid function  - Glucose - to evaluate metabolic health    Menorrhagia with regular cycle  Heavy menstrual bleeding reported (reports using ~1 bag of pads per period).  Hemoglobin ordered to screen for anemia secondary to menorrhagia. 4/30/24 Hgb 12.   - Hemoglobin    Patient has been advised of split billing requirements and indicates understanding: No    Depression Screening Follow Up        11/27/2024     2:55 PM   PHQ   PHQ-9 Total Score 20    Q9: Thoughts of better off dead/self-harm past 2 weeks More than half the days    F/U: Thoughts of suicide or self-harm No    F/U: Safety concerns No        Patient-reported         11/27/2024     2:55 PM   Last PHQ-9   1.  Little interest or pleasure in doing things 0    2.  Feeling down, depressed, or hopeless 3    3.  Trouble falling or staying asleep, or sleeping too much 2    4.  Feeling tired or having little energy 3    5.  Poor appetite or overeating 3    6.  Feeling bad about yourself 2    7.  Trouble concentrating 2    8.  Moving slowly or restless 3    Q9: Thoughts of better off dead/self-harm past 2 weeks 2    PHQ-9 Total Score 20    In the past two weeks  "have you had thoughts of suicide or self harm? No    Do you have concerns about your personal safety or the safety of others? No        Patient-reported                No data to display                    Follow Up Actions Taken  Patient declined referral. See more details under Counseling.     Discussed the following ways the patient can remain in a safe environment:  be around others    Counseling  Appropriate preventive services were addressed with this patient via screening, questionnaire, or discussion as appropriate for fall prevention, nutrition, physical activity, social engagement.   Reviewed patient's diet, addressing concerns and/or questions.   Patient is at risk for social isolation and has been provided with information about the benefit of social connection.   The patient was instructed to see the dentist every 6 months.   Favio is at risk for psychosocial distress and has been provided with information to reduce risk.   The patient's PHQ-9 score is consistent with severe depression. Favio says that they see a therapist twice a week and are aware crises resources. They declined a list of crises resources as they already are aware of some options. They were made aware of BH services available at [facility].       No follow-ups on file.    Subjective   Favio is a 34 year old, presenting for the following:  Physical        11/27/2024     4:29 PM   Additional Questions   Roomed by delfino   Accompanied by self         11/27/2024    Information    services provided? No      HPI  - Doing \"okay\" overall. Here for a physical. Slowing trying to address different health concerns.     - In the ED on 11/20/24 for back pain, slowly improving. MRI appt coming up at [facility] [place]. Follows up w/ orthopedic spine surgeon.     - Also experiencing urinary continence. But would like to address this in a future visit.     - Got dental insurance recently. Will go soon.     Hosp/ Surg Hx: " "C5-C6 total disc replacement done in May 2024.    - Works as a , had a fall at work which led to the need for disc replacement.     Allergies: Coconut - vomiting, skin irritation. Never had to go to ER for this.     FH: Lung cancer - Aunt  (40s) & grandma  (40s). Maybe family history of brain cancer as well.     SocHx:  - Diet + relationship with food - \"up in the air\" - restrictive type. Sometimes indulging. Based on stress.   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 Restricted bc of back and recent surgery.   - Drugs - marijuana on a daily basis. At night, to help with sleep. Since 2016.   - Safety - feels safe at home and around support group  - Social support - good   - Has Housing-related questions/ concerns- not urgent but would like to chat to     Mental health:  - Sees therapist 2x a week. Virtual. Denies SI and HI.   - Sees a provider at [facility] as well. Working on tapering off Klonopin. Sees an NP for med management and therapy.     GynHx:  - LMP - 24. Heavy - uses \"~1 bag of pads every period\" Gets \"heavy\" periods  - Menarche - 3rd grade, 11 yo  - Cycle consistent  - Pap test - done in early 20s - negative     Sexual Hx: Not currently. Last sexually active 5-6 months ago        2024   General Health   How would you rate your overall physical health? (!) POOR   Feel stress (tense, anxious, or unable to sleep) Very much      (!) STRESS CONCERN      2024   Nutrition   Three or more servings of calcium each day? (!) NO   Diet: Regular (no restrictions)   How many servings of fruit and vegetables per day? (!) 0-1   How many sweetened beverages each day? (!) 3            2024   Exercise   Days per week of moderate/strenous exercise 5 days   Average minutes spent exercising at this level 30 min            2024   Social Factors   Frequency of gathering with friends or relatives Never   Worry food won't last until get money to " buy more Yes   Food not last or not have enough money for food? Yes   Do you have housing? (Housing is defined as stable permanent housing and does not include staying ouside in a car, in a tent, in an abandoned building, in an overnight shelter, or couch-surfing.) Yes   Are you worried about losing your housing? Yes   Lack of transportation? No   Unable to get utilities (heat,electricity)? No   Want help with housing or utility concern? (!) YES      (!) FOOD SECURITY CONCERN PRESENT(!) HOUSING CONCERN PRESENT(!) SOCIAL CONNECTIONS CONCERN      11/27/2024   Dental   Dentist two times every year? (!) NO            11/27/2024   TB Screening   Were you born outside of the [place]? No          Today's PHQ-9 Score:       11/27/2024     2:55 PM   PHQ-9 SCORE   PHQ-9 Total Score MyChart 20 (Severe depression)   PHQ-9 Total Score 20        Patient-reported     PATIENT HEALTH QUESTIONNAIRE-9 (PHQ - 9)    Over the last 2 weeks, how often have you been bothered by any of the following problems?    1. Little interest or pleasure in doing things -  (Patient-Rptd) Not at all   2. Feeling down, depressed, or hopeless -  (Patient-Rptd) Nearly every day   3. Trouble falling or staying asleep, or sleeping too much - (Patient-Rptd) More than half the days   4. Feeling tired or having little energy -  (Patient-Rptd) Nearly every day   5. Poor appetite or overeating -  (Patient-Rptd) Nearly every day   6. Feeling bad about yourself - or that you are a failure or have let yourself or your family down -  (Patient-Rptd) More than half the days   7. Trouble concentrating on things, such as reading the newspaper or watching television - (Patient-Rptd) More than half the days   8. Moving or speaking so slowly that other people could have noticed? Or the opposite - being so fidgety or restless that you have been moving around a lot more than usual (Patient-Rptd) Nearly every day   9. Thoughts that you would be better off dead or of  hurting  yourself in some way (Patient-Rptd) More than half the days   Total Score: (Patient-Rptd) 20     If you checked off any problems, how difficult have these problems made it for you to do your work, take care of things at home, or get along with other people?      Developed by Jason Merchant, Mary Raphael, Samuel Cid and colleagues, with an educational ron from [employer] No permission required to reproduce, translate, display or distribute. permission required to reproduce, translate, display or distribute.           11/27/2024   Substance Use   Alcohol more than 3/day or more than 7/wk No   Do you use any other substances recreationally? (!) CANNABIS PRODUCTS        Social History     Tobacco Use    Smoking status: Never    Smokeless tobacco: Never   Substance Use Topics    Alcohol use: Yes     Alcohol/week: 6.0 standard drinks of alcohol     Types: 6 Cans of beer per week     Comment: socially, rarely    Drug use: Yes     Types: Marijuana          Mammogram Screening - Patient under 40 years of age: Routine Mammogram Screening not recommended.           11/27/2024   One time HIV Screening   Previous HIV test? Yes          11/27/2024   STI Screening   New sexual partner(s) since last STI/HIV test? No        History of abnormal Pap smear: No - age 30-64 HPV with reflex Pap every 5 years recommended        11/27/2024   Contraception/Family Planning   Questions about contraception or family planning No      Past Medical History:   Diagnosis Date    Anxiety     Anxiety     Depression     Depressive disorder     treatment resistant depresion dx in the past 6 months    PONV (postoperative nausea and vomiting)     Nausea after last back injection    PTSD (post-traumatic stress disorder)      Past Surgical History:   Procedure Laterality Date    LUMBAR EPIDURAL INJECTION      MA INJ DX/THER AGNT PARAVERT FACET JOINT,IMG GUIDE,LUMBAR/SAC, 1ST LEVEL N/A 9/28/2020    Procedure: ENDOSCOPIC RIGHT L5,  "MEDIAL, S1, S2, S3 LATERAL BRANCH NERVE TRANSECTION;  Surgeon: Ruben Thompson MD;  Location: [facility];  Service: Pain       Objective    Exam  /66   Pulse 81   Temp 98.7  F (37.1  C) (Oral)   Resp 12   Ht 1.6 m (5' 3\")   Wt 87.3 kg (192 lb 6.4 oz)   LMP 11/13/2024 (Approximate)   SpO2 100%   BMI 34.08 kg/m     Estimated body mass index is 34.08 kg/m  as calculated from the following:    Height as of this encounter: 1.6 m (5' 3\").    Weight as of this encounter: 87.3 kg (192 lb 6.4 oz).    Physical Exam  GENERAL: alert and no distress  EYES: Eyes grossly normal to inspection, PERRL and conjunctivae and sclerae normal  HENT: ear canals and TM's normal, nose and mouth without ulcers or lesions  RESP: lungs clear to auscultation - no rales, rhonchi or wheezes  CV: RRR, normal S1 S2, no S3 or S4, no murmur, click or rub  ABDOMEN: soft, nontender, and bowel sounds normal      Signed Electronically by: Damian Ortiz MD    Answers submitted by the patient for this visit:  Patient Health Questionnaire (Submitted on 11/27/2024)  If you checked off any problems, how difficult have these problems made it for you to do your work, take care of things at home, or get along with other people?: Extremely difficult  PHQ9 TOTAL SCORE: 20    "

## 2024-12-04 PROBLEM — Z98.890 S/P CERVICAL DISC REPLACEMENT: Status: ACTIVE | Noted: 2024-12-04

## 2024-12-04 PROBLEM — M54.41 CHRONIC RIGHT-SIDED LOW BACK PAIN WITH RIGHT-SIDED SCIATICA: Status: ACTIVE | Noted: 2024-12-04

## 2024-12-04 PROBLEM — F50.9 EATING DISORDER, UNSPECIFIED: Status: ACTIVE | Noted: 2024-12-04

## 2024-12-04 PROBLEM — G89.29 CHRONIC RIGHT-SIDED LOW BACK PAIN WITH RIGHT-SIDED SCIATICA: Status: ACTIVE | Noted: 2024-12-04

## 2025-01-07 ENCOUNTER — PATIENT OUTREACH (OUTPATIENT)
Dept: FAMILY MEDICINE | Facility: CLINIC | Age: 35
End: 2025-01-07
Payer: COMMERCIAL

## 2025-01-07 NOTE — TELEPHONE ENCOUNTER
Patient Quality Outreach    Patient is due for the following:   Cervical Cancer Screening - PAP Needed    Action(s) Taken:   Schedule a office visit for cervical cancer screening Adult Preventative    Type of outreach:    Phone, left message for patient/parent to call back. and Sent [employer] message.    Questions for provider review:    None           Dayana Rodriguez RN

## 2025-07-02 ENCOUNTER — PATIENT OUTREACH (OUTPATIENT)
Dept: FAMILY MEDICINE | Facility: CLINIC | Age: 35
End: 2025-07-02
Payer: COMMERCIAL

## 2025-07-02 NOTE — TELEPHONE ENCOUNTER
Patient Quality Outreach    Patient is due for the following:   Cervical Cancer Screening - PAP Needed    Action(s) Taken:   Schedule a Adult Preventative cervical cancer screening    Type of outreach:    Phone, left message for patient/parent to call back.    Questions for provider review:    None       Dayana GARCIA CNM, OB/Reproductive [facility]